# Patient Record
Sex: FEMALE | Race: WHITE | NOT HISPANIC OR LATINO | Employment: FULL TIME | URBAN - NONMETROPOLITAN AREA
[De-identification: names, ages, dates, MRNs, and addresses within clinical notes are randomized per-mention and may not be internally consistent; named-entity substitution may affect disease eponyms.]

---

## 2018-07-09 ENCOUNTER — TRANSCRIBE ORDERS (OUTPATIENT)
Dept: LAB | Facility: HOSPITAL | Age: 38
End: 2018-07-09

## 2018-07-09 ENCOUNTER — LAB (OUTPATIENT)
Dept: LAB | Facility: HOSPITAL | Age: 38
End: 2018-07-09

## 2018-07-09 DIAGNOSIS — E04.9 ENLARGEMENT OF THYROID: ICD-10-CM

## 2018-07-09 DIAGNOSIS — F11.20 OPIOID TYPE DEPENDENCE, CONTINUOUS (HCC): ICD-10-CM

## 2018-07-09 DIAGNOSIS — E65 LOCALIZED ADIPOSITY: ICD-10-CM

## 2018-07-09 DIAGNOSIS — D50.9 NORMOCYTIC HYPOCHROMIC ANEMIA: ICD-10-CM

## 2018-07-09 DIAGNOSIS — D50.0 BLOOD LOSS ANEMIA: ICD-10-CM

## 2018-07-09 DIAGNOSIS — D50.0 BLOOD LOSS ANEMIA: Primary | ICD-10-CM

## 2018-07-09 LAB
25(OH)D3 SERPL-MCNC: 78.3 NG/ML
ALBUMIN SERPL-MCNC: 4.3 G/DL (ref 3.5–5)
ALBUMIN/GLOB SERPL: 1.5 G/DL (ref 1–2)
ALP SERPL-CCNC: 65 U/L (ref 38–126)
ALT SERPL W P-5'-P-CCNC: 17 U/L (ref 13–69)
ANION GAP SERPL CALCULATED.3IONS-SCNC: 12.4 MMOL/L (ref 10–20)
AST SERPL-CCNC: 20 U/L (ref 15–46)
BASOPHILS # BLD AUTO: 0.1 10*3/MM3 (ref 0–0.2)
BASOPHILS NFR BLD AUTO: 1.1 % (ref 0–2.5)
BILIRUB SERPL-MCNC: 0.3 MG/DL (ref 0.2–1.3)
BUN BLD-MCNC: 9 MG/DL (ref 7–20)
BUN/CREAT SERPL: 7.5 (ref 7.1–23.5)
CALCIUM SPEC-SCNC: 9.9 MG/DL (ref 8.4–10.2)
CHLORIDE SERPL-SCNC: 102 MMOL/L (ref 98–107)
CHOLEST SERPL-MCNC: 204 MG/DL (ref 0–199)
CO2 SERPL-SCNC: 30 MMOL/L (ref 26–30)
CREAT BLD-MCNC: 1.2 MG/DL (ref 0.6–1.3)
DEPRECATED RDW RBC AUTO: 43.7 FL (ref 37–54)
EOSINOPHIL # BLD AUTO: 0.41 10*3/MM3 (ref 0–0.7)
EOSINOPHIL NFR BLD AUTO: 4.5 % (ref 0–7)
ERYTHROCYTE [DISTWIDTH] IN BLOOD BY AUTOMATED COUNT: 13.2 % (ref 11.5–14.5)
GFR SERPL CREATININE-BSD FRML MDRD: 50 ML/MIN/1.73
GLOBULIN UR ELPH-MCNC: 2.9 GM/DL
GLUCOSE BLD-MCNC: 91 MG/DL (ref 74–98)
HCT VFR BLD AUTO: 38.3 % (ref 37–47)
HDLC SERPL-MCNC: 49 MG/DL (ref 40–60)
HGB BLD-MCNC: 12.8 G/DL (ref 12–16)
IMM GRANULOCYTES # BLD: 0.01 10*3/MM3 (ref 0–0.06)
IMM GRANULOCYTES NFR BLD: 0.1 % (ref 0–0.6)
IRON 24H UR-MRATE: 79 MCG/DL (ref 37–181)
LDLC SERPL CALC-MCNC: 133 MG/DL (ref 0–99)
LDLC/HDLC SERPL: 2.71 {RATIO}
LYMPHOCYTES # BLD AUTO: 3.1 10*3/MM3 (ref 0.6–3.4)
LYMPHOCYTES NFR BLD AUTO: 34.3 % (ref 10–50)
MCH RBC QN AUTO: 30 PG (ref 27–31)
MCHC RBC AUTO-ENTMCNC: 33.4 G/DL (ref 30–37)
MCV RBC AUTO: 89.7 FL (ref 81–99)
MONOCYTES # BLD AUTO: 0.73 10*3/MM3 (ref 0–0.9)
MONOCYTES NFR BLD AUTO: 8.1 % (ref 0–12)
NEUTROPHILS # BLD AUTO: 4.68 10*3/MM3 (ref 2–6.9)
NEUTROPHILS NFR BLD AUTO: 51.9 % (ref 37–80)
NRBC BLD MANUAL-RTO: 0 /100 WBC (ref 0–0)
PLATELET # BLD AUTO: 314 10*3/MM3 (ref 130–400)
PMV BLD AUTO: 10.3 FL (ref 6–12)
POTASSIUM BLD-SCNC: 4.4 MMOL/L (ref 3.5–5.1)
PROT SERPL-MCNC: 7.2 G/DL (ref 6.3–8.2)
RBC # BLD AUTO: 4.27 10*6/MM3 (ref 4.2–5.4)
SODIUM BLD-SCNC: 140 MMOL/L (ref 137–145)
T4 FREE SERPL-MCNC: 1.03 NG/DL (ref 0.78–2.19)
TRIGL SERPL-MCNC: 110 MG/DL
TSH SERPL DL<=0.05 MIU/L-ACNC: 4.38 MIU/ML (ref 0.47–4.68)
VLDLC SERPL-MCNC: 22 MG/DL
WBC NRBC COR # BLD: 9.03 10*3/MM3 (ref 4.8–10.8)

## 2018-07-09 PROCEDURE — 84439 ASSAY OF FREE THYROXINE: CPT

## 2018-07-09 PROCEDURE — 80053 COMPREHEN METABOLIC PANEL: CPT

## 2018-07-09 PROCEDURE — 82306 VITAMIN D 25 HYDROXY: CPT

## 2018-07-09 PROCEDURE — 87389 HIV-1 AG W/HIV-1&-2 AB AG IA: CPT

## 2018-07-09 PROCEDURE — 85025 COMPLETE CBC W/AUTO DIFF WBC: CPT

## 2018-07-09 PROCEDURE — 80074 ACUTE HEPATITIS PANEL: CPT

## 2018-07-09 PROCEDURE — 83540 ASSAY OF IRON: CPT

## 2018-07-09 PROCEDURE — 36415 COLL VENOUS BLD VENIPUNCTURE: CPT

## 2018-07-09 PROCEDURE — 84481 FREE ASSAY (FT-3): CPT

## 2018-07-09 PROCEDURE — 80061 LIPID PANEL: CPT

## 2018-07-09 PROCEDURE — 84443 ASSAY THYROID STIM HORMONE: CPT

## 2018-07-10 LAB
HAV IGM SERPL QL IA: NEGATIVE
HBV CORE IGM SERPL QL IA: NEGATIVE
HBV SURFACE AG SERPL QL IA: NEGATIVE
HCV AB S/CO SERPL IA: <0.1 S/CO RATIO (ref 0–0.9)
HIV 1+2 AB+HIV1 P24 AG SERPL QL IA: NON REACTIVE
T3FREE SERPL-MCNC: 3.3 PG/ML (ref 2–4.4)

## 2019-07-16 ENCOUNTER — LAB (OUTPATIENT)
Dept: LAB | Facility: HOSPITAL | Age: 39
End: 2019-07-16

## 2019-07-16 ENCOUNTER — TRANSCRIBE ORDERS (OUTPATIENT)
Dept: LAB | Facility: HOSPITAL | Age: 39
End: 2019-07-16

## 2019-07-16 DIAGNOSIS — F11.20 OPIOID TYPE DEPENDENCE, CONTINUOUS (HCC): Primary | ICD-10-CM

## 2019-07-16 DIAGNOSIS — D50.0 IRON DEFICIENCY ANEMIA SECONDARY TO BLOOD LOSS (CHRONIC): ICD-10-CM

## 2019-07-16 DIAGNOSIS — F11.20 OPIOID TYPE DEPENDENCE, CONTINUOUS (HCC): ICD-10-CM

## 2019-07-16 LAB
25(OH)D3 SERPL-MCNC: 67.1 NG/ML
ALBUMIN SERPL-MCNC: 4.4 G/DL (ref 3.5–5)
ALBUMIN/GLOB SERPL: 1.3 G/DL (ref 1–2)
ALP SERPL-CCNC: 93 U/L (ref 38–126)
ALT SERPL W P-5'-P-CCNC: 27 U/L (ref 13–69)
ANION GAP SERPL CALCULATED.3IONS-SCNC: 12.1 MMOL/L (ref 10–20)
AST SERPL-CCNC: 29 U/L (ref 15–46)
BILIRUB SERPL-MCNC: 0.2 MG/DL (ref 0.2–1.3)
BUN BLD-MCNC: 8 MG/DL (ref 7–20)
BUN/CREAT SERPL: 8 (ref 7.1–23.5)
CALCIUM SPEC-SCNC: 9.2 MG/DL (ref 8.4–10.2)
CHLORIDE SERPL-SCNC: 100 MMOL/L (ref 98–107)
CHOLEST SERPL-MCNC: 251 MG/DL (ref 0–199)
CO2 SERPL-SCNC: 32 MMOL/L (ref 26–30)
CREAT BLD-MCNC: 1 MG/DL (ref 0.6–1.3)
DEPRECATED RDW RBC AUTO: 46.5 FL (ref 37–54)
ERYTHROCYTE [DISTWIDTH] IN BLOOD BY AUTOMATED COUNT: 14 % (ref 12.3–15.4)
GFR SERPL CREATININE-BSD FRML MDRD: 62 ML/MIN/1.73
GLOBULIN UR ELPH-MCNC: 3.3 GM/DL
GLUCOSE BLD-MCNC: 75 MG/DL (ref 74–98)
HCT VFR BLD AUTO: 41.1 % (ref 34–46.6)
HDLC SERPL-MCNC: 47 MG/DL (ref 40–60)
HGB BLD-MCNC: 13.4 G/DL (ref 12–15.9)
LDLC SERPL CALC-MCNC: 161 MG/DL (ref 0–99)
LDLC/HDLC SERPL: 3.43 {RATIO}
MCH RBC QN AUTO: 29.5 PG (ref 26.6–33)
MCHC RBC AUTO-ENTMCNC: 32.6 G/DL (ref 31.5–35.7)
MCV RBC AUTO: 90.5 FL (ref 79–97)
PLATELET # BLD AUTO: 355 10*3/MM3 (ref 140–450)
PMV BLD AUTO: 9.4 FL (ref 6–12)
POTASSIUM BLD-SCNC: 4.1 MMOL/L (ref 3.5–5.1)
PROT SERPL-MCNC: 7.7 G/DL (ref 6.3–8.2)
RBC # BLD AUTO: 4.54 10*6/MM3 (ref 3.77–5.28)
SODIUM BLD-SCNC: 140 MMOL/L (ref 137–145)
TRIGL SERPL-MCNC: 215 MG/DL
TSH SERPL DL<=0.05 MIU/L-ACNC: 4.99 MIU/ML (ref 0.47–4.68)
VLDLC SERPL-MCNC: 43 MG/DL
WBC NRBC COR # BLD: 8.14 10*3/MM3 (ref 3.4–10.8)

## 2019-07-16 PROCEDURE — 80074 ACUTE HEPATITIS PANEL: CPT

## 2019-07-16 PROCEDURE — 86701 HIV-1ANTIBODY: CPT

## 2019-07-16 PROCEDURE — 85027 COMPLETE CBC AUTOMATED: CPT

## 2019-07-16 PROCEDURE — 84443 ASSAY THYROID STIM HORMONE: CPT

## 2019-07-16 PROCEDURE — 80061 LIPID PANEL: CPT

## 2019-07-16 PROCEDURE — 82306 VITAMIN D 25 HYDROXY: CPT

## 2019-07-16 PROCEDURE — 86702 HIV-2 ANTIBODY: CPT

## 2019-07-16 PROCEDURE — 80053 COMPREHEN METABOLIC PANEL: CPT

## 2019-07-16 PROCEDURE — 36415 COLL VENOUS BLD VENIPUNCTURE: CPT

## 2019-07-17 LAB
HAV IGM SERPL QL IA: NEGATIVE
HBV CORE IGM SERPL QL IA: NEGATIVE
HBV SURFACE AG SERPL QL IA: NEGATIVE
HCV AB S/CO SERPL IA: <0.1 S/CO RATIO (ref 0–0.9)
HIV 1 & 2 AB SERPLBLD IA.RAPID: NEGATIVE
HIV 2 AB SERPLBLD QL IA.RAPID: NEGATIVE
HIV1 AB SERPLBLD QL IA.RAPID: NEGATIVE

## 2019-08-21 ENCOUNTER — LAB (OUTPATIENT)
Dept: LAB | Facility: HOSPITAL | Age: 39
End: 2019-08-21

## 2019-08-21 ENCOUNTER — TRANSCRIBE ORDERS (OUTPATIENT)
Dept: LAB | Facility: HOSPITAL | Age: 39
End: 2019-08-21

## 2019-08-21 DIAGNOSIS — E78.5 HYPERLIPIDEMIA, UNSPECIFIED HYPERLIPIDEMIA TYPE: ICD-10-CM

## 2019-08-21 DIAGNOSIS — E03.9 PRIMARY HYPOTHYROIDISM: ICD-10-CM

## 2019-08-21 DIAGNOSIS — E03.9 PRIMARY HYPOTHYROIDISM: Primary | ICD-10-CM

## 2019-08-21 LAB
T3 SERPL-MCNC: 167 NG/DL (ref 80–200)
T4 FREE SERPL-MCNC: 1 NG/DL (ref 0.93–1.7)
T4 SERPL-MCNC: 7.18 MCG/DL (ref 4.5–11.7)
TSH SERPL DL<=0.05 MIU/L-ACNC: 5.81 MIU/ML (ref 0.27–4.2)

## 2019-08-21 PROCEDURE — 84480 ASSAY TRIIODOTHYRONINE (T3): CPT

## 2019-08-21 PROCEDURE — 84436 ASSAY OF TOTAL THYROXINE: CPT

## 2019-08-21 PROCEDURE — 84439 ASSAY OF FREE THYROXINE: CPT

## 2019-08-21 PROCEDURE — 84443 ASSAY THYROID STIM HORMONE: CPT

## 2019-08-21 PROCEDURE — 36415 COLL VENOUS BLD VENIPUNCTURE: CPT

## 2019-11-13 ENCOUNTER — OFFICE VISIT (OUTPATIENT)
Dept: FAMILY MEDICINE CLINIC | Facility: CLINIC | Age: 39
End: 2019-11-13

## 2019-11-13 ENCOUNTER — APPOINTMENT (OUTPATIENT)
Dept: LAB | Facility: HOSPITAL | Age: 39
End: 2019-11-13

## 2019-11-13 VITALS
HEART RATE: 92 BPM | HEIGHT: 68 IN | WEIGHT: 267 LBS | SYSTOLIC BLOOD PRESSURE: 120 MMHG | DIASTOLIC BLOOD PRESSURE: 60 MMHG | OXYGEN SATURATION: 97 % | BODY MASS INDEX: 40.47 KG/M2

## 2019-11-13 DIAGNOSIS — Z87.891 PERSONAL HISTORY OF TOBACCO USE, PRESENTING HAZARDS TO HEALTH: ICD-10-CM

## 2019-11-13 DIAGNOSIS — Z83.49 FAMILY HISTORY OF HYPOTHYROIDISM: ICD-10-CM

## 2019-11-13 DIAGNOSIS — R60.0 PEDAL EDEMA: Primary | ICD-10-CM

## 2019-11-13 DIAGNOSIS — R63.5 WEIGHT GAIN: ICD-10-CM

## 2019-11-13 PROBLEM — E03.9 HYPOTHYROIDISM: Status: ACTIVE | Noted: 2019-11-13

## 2019-11-13 LAB
ALBUMIN SERPL-MCNC: 4.1 G/DL (ref 3.5–5.2)
ALBUMIN UR-MCNC: <1.2 MG/DL
ALBUMIN/GLOB SERPL: 1.1 G/DL
ALP SERPL-CCNC: 91 U/L (ref 39–117)
ALT SERPL W P-5'-P-CCNC: 15 U/L (ref 1–33)
ANION GAP SERPL CALCULATED.3IONS-SCNC: 12.5 MMOL/L (ref 5–15)
AST SERPL-CCNC: 15 U/L (ref 1–32)
BASOPHILS # BLD AUTO: 0.1 10*3/MM3 (ref 0–0.2)
BASOPHILS NFR BLD AUTO: 0.9 % (ref 0–1.5)
BILIRUB SERPL-MCNC: 0.2 MG/DL (ref 0.2–1.2)
BILIRUB UR QL STRIP: NEGATIVE
BUN BLD-MCNC: 4 MG/DL (ref 6–20)
BUN/CREAT SERPL: 3.8 (ref 7–25)
CALCIUM SPEC-SCNC: 9.5 MG/DL (ref 8.6–10.5)
CHLORIDE SERPL-SCNC: 101 MMOL/L (ref 98–107)
CHOLEST SERPL-MCNC: 189 MG/DL (ref 0–200)
CLARITY UR: CLEAR
CO2 SERPL-SCNC: 28.5 MMOL/L (ref 22–29)
COLOR UR: YELLOW
CREAT BLD-MCNC: 1.05 MG/DL (ref 0.57–1)
CREAT UR-MCNC: 57.6 MG/DL
DEPRECATED RDW RBC AUTO: 45 FL (ref 37–54)
EOSINOPHIL # BLD AUTO: 0.39 10*3/MM3 (ref 0–0.4)
EOSINOPHIL NFR BLD AUTO: 3.6 % (ref 0.3–6.2)
ERYTHROCYTE [DISTWIDTH] IN BLOOD BY AUTOMATED COUNT: 13.3 % (ref 12.3–15.4)
FERRITIN SERPL-MCNC: 53.6 NG/ML (ref 13–150)
FSH SERPL-ACNC: 13.9 MIU/ML
GFR SERPL CREATININE-BSD FRML MDRD: 58 ML/MIN/1.73
GLOBULIN UR ELPH-MCNC: 3.6 GM/DL
GLUCOSE BLD-MCNC: 78 MG/DL (ref 65–99)
GLUCOSE UR STRIP-MCNC: NEGATIVE MG/DL
HBA1C MFR BLD: 6.11 % (ref 4.8–5.6)
HCT VFR BLD AUTO: 40 % (ref 34–46.6)
HDLC SERPL-MCNC: 49 MG/DL (ref 40–60)
HGB BLD-MCNC: 13 G/DL (ref 12–15.9)
HGB UR QL STRIP.AUTO: NEGATIVE
IMM GRANULOCYTES # BLD AUTO: 0.04 10*3/MM3 (ref 0–0.05)
IMM GRANULOCYTES NFR BLD AUTO: 0.4 % (ref 0–0.5)
KETONES UR QL STRIP: NEGATIVE
LDLC SERPL CALC-MCNC: 99 MG/DL (ref 0–100)
LDLC/HDLC SERPL: 2.02 {RATIO}
LEUKOCYTE ESTERASE UR QL STRIP.AUTO: NEGATIVE
LH SERPL-ACNC: 11.9 MIU/ML
LYMPHOCYTES # BLD AUTO: 2.01 10*3/MM3 (ref 0.7–3.1)
LYMPHOCYTES NFR BLD AUTO: 18.5 % (ref 19.6–45.3)
MCH RBC QN AUTO: 29.6 PG (ref 26.6–33)
MCHC RBC AUTO-ENTMCNC: 32.5 G/DL (ref 31.5–35.7)
MCV RBC AUTO: 91.1 FL (ref 79–97)
MICROALBUMIN/CREAT UR: NORMAL MG/G{CREAT}
MONOCYTES # BLD AUTO: 0.93 10*3/MM3 (ref 0.1–0.9)
MONOCYTES NFR BLD AUTO: 8.6 % (ref 5–12)
NEUTROPHILS # BLD AUTO: 7.37 10*3/MM3 (ref 1.7–7)
NEUTROPHILS NFR BLD AUTO: 68 % (ref 42.7–76)
NITRITE UR QL STRIP: NEGATIVE
NRBC BLD AUTO-RTO: 0.1 /100 WBC (ref 0–0.2)
PH UR STRIP.AUTO: 7 [PH] (ref 5–8)
PLATELET # BLD AUTO: 372 10*3/MM3 (ref 140–450)
PMV BLD AUTO: 11 FL (ref 6–12)
POTASSIUM BLD-SCNC: 4.6 MMOL/L (ref 3.5–5.2)
PROLACTIN SERPL-MCNC: 16.9 NG/ML (ref 4.79–23.3)
PROT SERPL-MCNC: 7.7 G/DL (ref 6–8.5)
PROT UR QL STRIP: NEGATIVE
RBC # BLD AUTO: 4.39 10*6/MM3 (ref 3.77–5.28)
SODIUM BLD-SCNC: 142 MMOL/L (ref 136–145)
SP GR UR STRIP: 1.01 (ref 1–1.03)
T3FREE SERPL-MCNC: 3.71 PG/ML (ref 2–4.4)
T4 FREE SERPL-MCNC: 0.98 NG/DL (ref 0.93–1.7)
TESTOST SERPL-MCNC: 16.1 NG/DL (ref 8.4–48.1)
TRIGL SERPL-MCNC: 204 MG/DL (ref 0–150)
TSH SERPL DL<=0.05 MIU/L-ACNC: 5.32 UIU/ML (ref 0.27–4.2)
UROBILINOGEN UR QL STRIP: NORMAL
VLDLC SERPL-MCNC: 40.8 MG/DL (ref 5–40)
WBC NRBC COR # BLD: 10.84 10*3/MM3 (ref 3.4–10.8)

## 2019-11-13 PROCEDURE — 82728 ASSAY OF FERRITIN: CPT | Performed by: FAMILY MEDICINE

## 2019-11-13 PROCEDURE — 84481 FREE ASSAY (FT-3): CPT | Performed by: FAMILY MEDICINE

## 2019-11-13 PROCEDURE — 84146 ASSAY OF PROLACTIN: CPT | Performed by: FAMILY MEDICINE

## 2019-11-13 PROCEDURE — 85025 COMPLETE CBC W/AUTO DIFF WBC: CPT | Performed by: FAMILY MEDICINE

## 2019-11-13 PROCEDURE — 84403 ASSAY OF TOTAL TESTOSTERONE: CPT | Performed by: FAMILY MEDICINE

## 2019-11-13 PROCEDURE — 83001 ASSAY OF GONADOTROPIN (FSH): CPT | Performed by: FAMILY MEDICINE

## 2019-11-13 PROCEDURE — 82043 UR ALBUMIN QUANTITATIVE: CPT | Performed by: FAMILY MEDICINE

## 2019-11-13 PROCEDURE — 80061 LIPID PANEL: CPT | Performed by: FAMILY MEDICINE

## 2019-11-13 PROCEDURE — 82570 ASSAY OF URINE CREATININE: CPT | Performed by: FAMILY MEDICINE

## 2019-11-13 PROCEDURE — 84402 ASSAY OF FREE TESTOSTERONE: CPT | Performed by: FAMILY MEDICINE

## 2019-11-13 PROCEDURE — 80053 COMPREHEN METABOLIC PANEL: CPT | Performed by: FAMILY MEDICINE

## 2019-11-13 PROCEDURE — 83002 ASSAY OF GONADOTROPIN (LH): CPT | Performed by: FAMILY MEDICINE

## 2019-11-13 PROCEDURE — 83036 HEMOGLOBIN GLYCOSYLATED A1C: CPT | Performed by: FAMILY MEDICINE

## 2019-11-13 PROCEDURE — 84443 ASSAY THYROID STIM HORMONE: CPT | Performed by: FAMILY MEDICINE

## 2019-11-13 PROCEDURE — 82627 DEHYDROEPIANDROSTERONE: CPT | Performed by: FAMILY MEDICINE

## 2019-11-13 PROCEDURE — 81003 URINALYSIS AUTO W/O SCOPE: CPT | Performed by: FAMILY MEDICINE

## 2019-11-13 PROCEDURE — 84439 ASSAY OF FREE THYROXINE: CPT | Performed by: FAMILY MEDICINE

## 2019-11-13 PROCEDURE — 99204 OFFICE O/P NEW MOD 45 MIN: CPT | Performed by: FAMILY MEDICINE

## 2019-11-13 RX ORDER — ATORVASTATIN CALCIUM 20 MG/1
1 TABLET, FILM COATED ORAL DAILY
Refills: 0 | COMMUNITY
Start: 2019-11-06 | End: 2020-03-19 | Stop reason: SDUPTHER

## 2019-11-13 RX ORDER — IBUPROFEN 800 MG/1
TABLET ORAL
Refills: 0 | COMMUNITY
Start: 2019-11-06 | End: 2020-01-29 | Stop reason: ALTCHOICE

## 2019-11-13 RX ORDER — GABAPENTIN 300 MG/1
1 CAPSULE ORAL 2 TIMES DAILY
Refills: 0 | COMMUNITY
Start: 2019-10-22 | End: 2020-01-29 | Stop reason: SDUPTHER

## 2019-11-13 RX ORDER — BUPROPION HYDROCHLORIDE 150 MG/1
TABLET, EXTENDED RELEASE ORAL
Refills: 0 | COMMUNITY
Start: 2019-10-27 | End: 2020-03-19 | Stop reason: SDUPTHER

## 2019-11-13 RX ORDER — BUPRENORPHINE HYDROCHLORIDE, NALOXONE HYDROCHLORIDE 8; 2 MG/1; MG/1
FILM, SOLUBLE BUCCAL; SUBLINGUAL
Refills: 0 | COMMUNITY
Start: 2019-10-22

## 2019-11-13 RX ORDER — DULOXETIN HYDROCHLORIDE 20 MG/1
3 CAPSULE, DELAYED RELEASE ORAL DAILY
Refills: 0 | COMMUNITY
Start: 2019-10-22 | End: 2020-03-19 | Stop reason: SDUPTHER

## 2019-11-13 RX ORDER — ACYCLOVIR 800 MG/1
TABLET ORAL
Refills: 0 | COMMUNITY
Start: 2019-08-27

## 2019-11-13 NOTE — PROGRESS NOTES
Subjective   Shanae Alves is a 39 y.o. female.     Chief Complaint   Patient presents with   • Establish Care     edema, TSH       History of Present Illness     Previous primary care: Dr. Valencia    Chronic health conditions:  Pedal edema: x last 6 months, has stayed about the same. Left foot ankle pain when it starts to swell.  HLD: On atorvastatin 20mg daily  Depression: Stable on duloxetine 40mg (2 capsules of 20mg) daily and Wellbutrin SR 150mg BID  Weight gain over past 1 year: Went through a divorce, gained 60 pounds over the last 1 year. 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 Stopped smoking 1 year ago. Previously 2ppd. Back up to 1ppd.  Suboxone therapy: Will continue with another MD in Dr. Valencia's office    Other physicians currently involved in patient's care:  Dr. Mancilla, GYN    Acute complaints:  Shanae Alves is a 39 y.o. female who presents today to establish care.     This patient is accompanied by their self who contributes to the history of their care.    The following portions of the patient's history were reviewed and updated as appropriate: allergies, current medications, past family history, past medical history, past social history, past surgical history and problem list.    Active Ambulatory Problems     Diagnosis Date Noted   • Family history of hypothyroidism 11/13/2019   • Personal history of tobacco use, presenting hazards to health 11/13/2019     Resolved Ambulatory Problems     Diagnosis Date Noted   • No Resolved Ambulatory Problems     Past Medical History:   Diagnosis Date   • Arthritis    • Depression    • Headache    • Hyperlipidemia    • Hypothyroidism      Past Surgical History:   Procedure Laterality Date   • CERVICAL CERCLAGE  2012     Family History   Problem Relation Age of Onset   • Arthritis Mother    • Hyperlipidemia Mother    • Thyroid disease Mother    • Arthritis Father      Social History     Socioeconomic History   • Marital status: Single      "Spouse name: Not on file   • Number of children: Not on file   • Years of education: Not on file   • Highest education level: Not on file   Tobacco Use   • Smoking status: Current Every Day Smoker     Packs/day: 1.00     Types: Cigarettes   • Smokeless tobacco: Never Used   Substance and Sexual Activity   • Alcohol use: No     Frequency: Never   • Drug use: No         Review of Systems   Constitutional: Positive for fatigue and unexpected weight change.   Cardiovascular: Positive for leg swelling.       Objective   Blood pressure 120/60, pulse 92, height 172.7 cm (68\"), weight 121 kg (267 lb), SpO2 97 %.  Nursing note reviewed  Physical Exam  Const: NAD, A&Ox4, Pleasant, Cooperative  Eyes: EOMI, no conjunctivitis  ENT: No nasal discharge present, neck supple  Cardiac: Regular rate and rhythm, no cyanosis  Resp: Respiratory rate within normal limits, no increased work of breathing, no audible wheezing or retractions noted  GI: No distention or ascites  MSK: Very trace pedal edema  Procedures  Assessment/Plan   Problem List Items Addressed This Visit        Other    Family history of hypothyroidism    Relevant Orders    Testosterone    DHEA-sulfate    Follicle stimulating hormone    Luteinizing hormone    Prolactin    TSH    Hemoglobin A1c    Lipid Panel    T4, Free    T3, Free    Ferritin    Comprehensive Metabolic Panel    CBC & Differential    Urinalysis With Microscopic If Indicated (No Culture) - Urine, Clean Catch    Microalbumin / Creatinine Urine Ratio - Urine, Clean Catch    Testosterone Free Direct    Personal history of tobacco use, presenting hazards to health    Relevant Orders    Testosterone    DHEA-sulfate    Follicle stimulating hormone    Luteinizing hormone    Prolactin    TSH    Hemoglobin A1c    Lipid Panel    T4, Free    T3, Free    Ferritin    Comprehensive Metabolic Panel    CBC & Differential    Urinalysis With Microscopic If Indicated (No Culture) - Urine, Clean Catch    Microalbumin / " Creatinine Urine Ratio - Urine, Clean Catch    Testosterone Free Direct      Other Visit Diagnoses     Pedal edema    -  Primary    Relevant Orders    Testosterone    DHEA-sulfate    Follicle stimulating hormone    Luteinizing hormone    Prolactin    TSH    Hemoglobin A1c    Lipid Panel    T4, Free    T3, Free    Ferritin    Comprehensive Metabolic Panel    CBC & Differential    Urinalysis With Microscopic If Indicated (No Culture) - Urine, Clean Catch    Microalbumin / Creatinine Urine Ratio - Urine, Clean Catch    Testosterone Free Direct    Weight gain        Relevant Orders    Testosterone    DHEA-sulfate    Follicle stimulating hormone    Luteinizing hormone    Prolactin    TSH    Hemoglobin A1c    Lipid Panel    T4, Free    T3, Free    Ferritin    Comprehensive Metabolic Panel    CBC & Differential    Urinalysis With Microscopic If Indicated (No Culture) - Urine, Clean Catch    Microalbumin / Creatinine Urine Ratio - Urine, Clean Catch    Testosterone Free Direct        #1 pedal edema  No significant edema on exam today.  With her weight gain and family history of hypothyroidism, thyroid dysfunction is certainly a possibility.  We also discussed possible contributions from low activity, chronic opioid therapy, and hormone dysfunction.  Will check a panel of labs as indicated above, I will see her back in 1 week for review.    I have spent 45 minutes face-to-face with 25 minutes spent counseling the patient on the diagnoses, possible treatment options and outcomes, ajunctive and alternative treatments, and developing a care plan.    We will plan to obtain previous records both for chronic preventative care as well as those related to the current episode of care.  Any records that the patient brought with her today were reviewed personally by me during the visit today and will be scanned into the chart for posterity.    There are no Patient Instructions on file for this visit.    Return in about 1 week (around  11/20/2019) for follow up to discuss lab results.    Ambulatory progress note signed and attested to by Junior Lujan D.O.

## 2019-11-20 ENCOUNTER — OFFICE VISIT (OUTPATIENT)
Dept: FAMILY MEDICINE CLINIC | Facility: CLINIC | Age: 39
End: 2019-11-20

## 2019-11-20 VITALS
OXYGEN SATURATION: 98 % | HEIGHT: 68 IN | SYSTOLIC BLOOD PRESSURE: 140 MMHG | WEIGHT: 263 LBS | HEART RATE: 103 BPM | DIASTOLIC BLOOD PRESSURE: 80 MMHG | BODY MASS INDEX: 39.86 KG/M2

## 2019-11-20 DIAGNOSIS — R63.5 WEIGHT GAIN: ICD-10-CM

## 2019-11-20 DIAGNOSIS — Z83.49 FAMILY HISTORY OF HYPOTHYROIDISM: ICD-10-CM

## 2019-11-20 DIAGNOSIS — R60.0 PEDAL EDEMA: Primary | ICD-10-CM

## 2019-11-20 DIAGNOSIS — G47.9 DYSSOMNIA: ICD-10-CM

## 2019-11-20 DIAGNOSIS — R73.03 PREDIABETES: ICD-10-CM

## 2019-11-20 DIAGNOSIS — E03.8 SUBCLINICAL HYPOTHYROIDISM: ICD-10-CM

## 2019-11-20 DIAGNOSIS — Z79.899 CONTROLLED SUBSTANCE AGREEMENT SIGNED: ICD-10-CM

## 2019-11-20 DIAGNOSIS — Z87.891 PERSONAL HISTORY OF TOBACCO USE, PRESENTING HAZARDS TO HEALTH: ICD-10-CM

## 2019-11-20 PROCEDURE — 99214 OFFICE O/P EST MOD 30 MIN: CPT | Performed by: FAMILY MEDICINE

## 2019-11-20 RX ORDER — PRASTERONE (DHEA) 50 MG
50 CAPSULE ORAL DAILY
Qty: 90 EACH | Refills: 3 | Status: SHIPPED | OUTPATIENT
Start: 2019-11-20 | End: 2020-01-29 | Stop reason: SINTOL

## 2019-11-20 RX ORDER — CHOLECALCIFEROL (VITAMIN D3) 125 MCG
5 CAPSULE ORAL NIGHTLY
Qty: 90 TABLET | Refills: 3 | Status: SHIPPED | OUTPATIENT
Start: 2019-11-20 | End: 2020-09-22 | Stop reason: SDUPTHER

## 2019-11-20 RX ORDER — LEVOTHYROXINE SODIUM 0.03 MG/1
25 TABLET ORAL DAILY
Qty: 30 TABLET | Refills: 2 | Status: SHIPPED | OUTPATIENT
Start: 2019-11-20 | End: 2020-01-29 | Stop reason: SDUPTHER

## 2019-11-20 NOTE — PATIENT INSTRUCTIONS
1.  New medications and supplements - as listed.    2.  Continue well-balanced diet - low in calories and low in added sugar.  -Plant-based diets have the best evidence for decreasing inflammation and chronic pain, as well as reducing the risk of heart disease and dementia.    3.  Maintain a routine exercise program - 30 minutes at least 3 days every week.  This is important even if you are active at your job. Try to walk at least 5000 steps per day.    4.  Completely stop all regular soda. Water if possible.    5.  If you would like to have your labs completed prior to your next visit to be discussed at your appointment, please call the office 1 to 2 weeks before your scheduled visit to request that lab orders be placed.

## 2019-11-20 NOTE — PROGRESS NOTES
Subjective   Shanae Alves is a 39 y.o. female.     Chief Complaint   Patient presents with   • Follow-up       History of Present Illness     Chronic health conditions:  Pedal edema: x last 6 months, has stayed about the same. Left foot ankle pain when it starts to swell.  HLD: On atorvastatin 20mg daily  Depression: Stable on duloxetine 40mg (2 capsules of 20mg) daily and Wellbutrin SR 150mg BID  Weight gain over past 1 year: Went through a divorce, gained 60 pounds over the last 1 year. 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 Stopped smoking 1 year ago. Previously 2ppd. Back up to 1ppd.  Suboxone therapy: Will continue with another MD in Dr. Valencia's office.  She will need the gabapentin prescribed by our office and will fill out a controlled substance agreement today.    Other physicians currently involved in patient's care:  Dr. Mancilla, GYN    Acute complaints:  Shanae Alves is a 39 y.o. female who presents today to follow-up on her labs.  She had them drawn last week.  Her symptoms have not changed, she still has significant fatigue, trouble sleeping, typically getting about 5 hours per night.    This patient is accompanied by their self who contributes to the history of their care.    The following portions of the patient's history were reviewed and updated as appropriate: allergies, current medications, past family history, past medical history, past social history, past surgical history and problem list.    Active Ambulatory Problems     Diagnosis Date Noted   • Family history of hypothyroidism 11/13/2019   • Personal history of tobacco use, presenting hazards to health 11/13/2019   • Subclinical hypothyroidism 11/20/2019   • Prediabetes 11/20/2019     Resolved Ambulatory Problems     Diagnosis Date Noted   • No Resolved Ambulatory Problems     Past Medical History:   Diagnosis Date   • Arthritis    • Depression    • Headache    • Hyperlipidemia    • Hypothyroidism      Past Surgical  "History:   Procedure Laterality Date   • CERVICAL CERCLAGE  2012     Family History   Problem Relation Age of Onset   • Arthritis Mother    • Hyperlipidemia Mother    • Thyroid disease Mother    • Arthritis Father      Social History     Socioeconomic History   • Marital status: Single     Spouse name: Not on file   • Number of children: Not on file   • Years of education: Not on file   • Highest education level: Not on file   Tobacco Use   • Smoking status: Current Every Day Smoker     Packs/day: 1.00     Types: Cigarettes   • Smokeless tobacco: Never Used   Substance and Sexual Activity   • Alcohol use: No     Frequency: Never   • Drug use: No         Review of Systems   Constitutional: Positive for fatigue.   Respiratory: Negative.    Cardiovascular: Positive for leg swelling.   Musculoskeletal: Positive for joint swelling.       Objective   Blood pressure 140/80, pulse 103, height 172.7 cm (67.99\"), weight 119 kg (263 lb), SpO2 98 %.  Nursing note reviewed  Physical Exam  Const: NAD, A&Ox4, Pleasant, Cooperative  Eyes: EOMI, no conjunctivitis  ENT: No nasal discharge present, neck supple  Cardiac: Regular rate and rhythm, no cyanosis  Resp: Respiratory rate within normal limits, no increased work of breathing, no audible wheezing or retractions noted  GI: No distention or ascites  MSK: No significant pedal edema noted today  Procedures  Assessment/Plan   Problem List Items Addressed This Visit        Endocrine    Subclinical hypothyroidism    Relevant Medications    levothyroxine (SYNTHROID, LEVOTHROID) 25 MCG tablet    Other Relevant Orders    TSH       Other    Family history of hypothyroidism    Personal history of tobacco use, presenting hazards to health    Prediabetes    Overview     A1c 6.11% November 2019         Relevant Medications    metFORMIN (GLUCOPHAGE) 500 MG tablet    DHEA 50 MG capsule    Other Relevant Orders    Basic Metabolic Panel      Other Visit Diagnoses     Pedal edema    -  Primary    " Relevant Medications    levothyroxine (SYNTHROID, LEVOTHROID) 25 MCG tablet    Other Relevant Orders    Basic Metabolic Panel    Weight gain        Relevant Medications    DHEA 50 MG capsule    Dyssomnia        Relevant Medications    melatonin 5 MG tablet tablet        #1 subclinical hypothyroidism  We discussed at length the physiology of the thyroid and thyroid regulation.  She has a very slightly elevated TSH with a low normal free T4 level.  We discussed that the TSH may be transiently elevated.  However given her significant family history of hypothyroidism, as well as her symptoms correlated with same, it would be a reasonable option to start her on a low-dose of levothyroxine and assess her response over the next 6 weeks.    #2 prediabetes  This is a new diagnosis.  We discussed the diagnosis and recommendations.  She may benefit from diabetic education and nutrition consultation.    #3 dyssomnia  Discussed sleep hygiene, recommended melatonin 5 mg at night    4.  Weight gain/obesity  Obviously diet and calorie intake will be important.  She is typically drinking 2-3 regular sodas per day.  We discussed the caloric impact of these, and I recommended that she avoid all calorie-containing beverages going forward.    I have spent 25 minutes face-to-face with 15 minutes spent counseling the patient on the diagnoses, possible treatment options and outcomes, adjunctive and alternative treatments, and developing a care plan.    Patient Instructions   1.  New medications and supplements - as listed.    2.  Continue well-balanced diet - low in calories and low in added sugar.  -Plant-based diets have the best evidence for decreasing inflammation and chronic pain, as well as reducing the risk of heart disease and dementia.    3.  Maintain a routine exercise program - 30 minutes at least 3 days every week.  This is important even if you are active at your job. Try to walk at least 5000 steps per day.    4.  Completely  stop all regular soda. Water if possible.    5.  If you would like to have your labs completed prior to your next visit to be discussed at your appointment, please call the office 1 to 2 weeks before your scheduled visit to request that lab orders be placed.      Return in about 7 weeks (around 1/8/2020) for (non-fasting blood work 1 week prior to appt).    Ambulatory progress note signed and attested to by Junior Lujan D.O.

## 2019-11-21 LAB
DHEA-S SERPL-MCNC: 114.8 UG/DL (ref 57.3–279.2)
TESTOST FREE SERPL-MCNC: 0.5 PG/ML (ref 0–4.2)

## 2019-11-27 DIAGNOSIS — Z79.899 CONTROLLED SUBSTANCE AGREEMENT SIGNED: ICD-10-CM

## 2020-01-20 ENCOUNTER — LAB (OUTPATIENT)
Dept: LAB | Facility: HOSPITAL | Age: 40
End: 2020-01-20

## 2020-01-20 ENCOUNTER — APPOINTMENT (OUTPATIENT)
Dept: LAB | Facility: HOSPITAL | Age: 40
End: 2020-01-20

## 2020-01-20 DIAGNOSIS — R60.0 PEDAL EDEMA: ICD-10-CM

## 2020-01-20 DIAGNOSIS — R73.03 PREDIABETES: ICD-10-CM

## 2020-01-20 DIAGNOSIS — E03.8 SUBCLINICAL HYPOTHYROIDISM: ICD-10-CM

## 2020-01-20 LAB
ANION GAP SERPL CALCULATED.3IONS-SCNC: 11.1 MMOL/L (ref 5–15)
BUN BLD-MCNC: 8 MG/DL (ref 6–20)
BUN/CREAT SERPL: 7.9 (ref 7–25)
CALCIUM SPEC-SCNC: 9.7 MG/DL (ref 8.6–10.5)
CHLORIDE SERPL-SCNC: 98 MMOL/L (ref 98–107)
CO2 SERPL-SCNC: 29.9 MMOL/L (ref 22–29)
CREAT BLD-MCNC: 1.01 MG/DL (ref 0.57–1)
GFR SERPL CREATININE-BSD FRML MDRD: 61 ML/MIN/1.73
GLUCOSE BLD-MCNC: 94 MG/DL (ref 65–99)
POTASSIUM BLD-SCNC: 5.2 MMOL/L (ref 3.5–5.2)
SODIUM BLD-SCNC: 139 MMOL/L (ref 136–145)
TSH SERPL DL<=0.05 MIU/L-ACNC: 2.56 UIU/ML (ref 0.27–4.2)

## 2020-01-20 PROCEDURE — 80048 BASIC METABOLIC PNL TOTAL CA: CPT | Performed by: FAMILY MEDICINE

## 2020-01-20 PROCEDURE — 84443 ASSAY THYROID STIM HORMONE: CPT | Performed by: FAMILY MEDICINE

## 2020-01-29 ENCOUNTER — OFFICE VISIT (OUTPATIENT)
Dept: FAMILY MEDICINE CLINIC | Facility: CLINIC | Age: 40
End: 2020-01-29

## 2020-01-29 VITALS
WEIGHT: 254 LBS | HEIGHT: 68 IN | OXYGEN SATURATION: 98 % | HEART RATE: 88 BPM | DIASTOLIC BLOOD PRESSURE: 70 MMHG | BODY MASS INDEX: 38.49 KG/M2 | SYSTOLIC BLOOD PRESSURE: 124 MMHG

## 2020-01-29 DIAGNOSIS — R73.03 PREDIABETES: Primary | ICD-10-CM

## 2020-01-29 DIAGNOSIS — R60.0 PEDAL EDEMA: ICD-10-CM

## 2020-01-29 DIAGNOSIS — M25.562 ACUTE PAIN OF LEFT KNEE: ICD-10-CM

## 2020-01-29 DIAGNOSIS — M25.50 POLYARTHRALGIA: ICD-10-CM

## 2020-01-29 DIAGNOSIS — E03.8 SUBCLINICAL HYPOTHYROIDISM: ICD-10-CM

## 2020-01-29 PROCEDURE — 99214 OFFICE O/P EST MOD 30 MIN: CPT | Performed by: FAMILY MEDICINE

## 2020-01-29 RX ORDER — MELOXICAM 15 MG/1
15 TABLET ORAL DAILY
Qty: 30 TABLET | Refills: 1 | Status: SHIPPED | OUTPATIENT
Start: 2020-01-29 | End: 2020-03-19 | Stop reason: SDUPTHER

## 2020-01-29 RX ORDER — LEVOTHYROXINE SODIUM 0.03 MG/1
25 TABLET ORAL DAILY
Qty: 90 TABLET | Refills: 3 | Status: SHIPPED | OUTPATIENT
Start: 2020-01-29 | End: 2020-03-19 | Stop reason: SDUPTHER

## 2020-01-29 RX ORDER — GABAPENTIN 300 MG/1
300 CAPSULE ORAL 2 TIMES DAILY
Qty: 60 CAPSULE | Refills: 2 | Status: SHIPPED | OUTPATIENT
Start: 2020-01-29 | End: 2020-03-19 | Stop reason: SDUPTHER

## 2020-01-29 NOTE — PATIENT INSTRUCTIONS
1.  Stop the DHEA    2.  Call if sweating not improved    3.  If knee pain flares again call we will check a uric acid level

## 2020-01-29 NOTE — PROGRESS NOTES
Subjective   Shanae Alves is a 39 y.o. female.     Chief Complaint   Patient presents with   • Follow-up       History of Present Illness     Chronic health conditions:  Pedal edema: This has almost completely resolved since starting the levothyroxine  HLD: On atorvastatin 20mg daily  Depression: Stable on duloxetine 40mg (2 capsules of 20mg) daily and Wellbutrin SR 150mg BID  Weight gain over past 1 year: Went through a divorce, gained 60 pounds over the last 1 year. 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 Stopped smoking 1 year ago. Previously 2ppd. Back up to 1ppd.  Suboxone therapy: Will continue with another MD in Dr. Valencia's office.  She will need the gabapentin prescribed by our office and will fill out a controlled substance agreement today.  Obesity: She was started on metformin 500 mg twice daily for prediabetes, as well as DHEA to assist with weight loss.  She has noticed some hyperhidrosis above her usual level.    Other physicians currently involved in patient's care:  Dr. Mancilla, GYN    Acute complaints:  Shanae Alves is a 39 y.o. female who presents today to follow-up on her labs.  She had them drawn last week.  Her TSH has improved back down to normal range.  She has been able to lose about 10 pounds in the last 6 weeks including the holiday.    This patient is accompanied by their self who contributes to the history of their care.    The following portions of the patient's history were reviewed and updated as appropriate: allergies, current medications, past family history, past medical history, past social history, past surgical history and problem list.    Active Ambulatory Problems     Diagnosis Date Noted   • Family history of hypothyroidism 11/13/2019   • Personal history of tobacco use, presenting hazards to health 11/13/2019   • Subclinical hypothyroidism 11/20/2019   • Prediabetes 11/20/2019     Resolved Ambulatory Problems     Diagnosis Date Noted   • No Resolved  "Ambulatory Problems     Past Medical History:   Diagnosis Date   • Arthritis    • Depression    • Headache    • Hyperlipidemia    • Hypothyroidism      Past Surgical History:   Procedure Laterality Date   • CERVICAL CERCLAGE  2012     Family History   Problem Relation Age of Onset   • Arthritis Mother    • Hyperlipidemia Mother    • Thyroid disease Mother    • Arthritis Father      Social History     Socioeconomic History   • Marital status: Single     Spouse name: Not on file   • Number of children: Not on file   • Years of education: Not on file   • Highest education level: Not on file   Tobacco Use   • Smoking status: Current Every Day Smoker     Packs/day: 1.00     Types: Cigarettes   • Smokeless tobacco: Never Used   Substance and Sexual Activity   • Alcohol use: No     Frequency: Never   • Drug use: No         Review of Systems   Respiratory: Negative.    Musculoskeletal: Positive for arthralgias and joint swelling.       Objective   Blood pressure 124/70, pulse 88, height 172.7 cm (67.99\"), weight 115 kg (254 lb), SpO2 98 %.  Nursing note reviewed  Physical Exam  Const: NAD, A&Ox4, Pleasant, Cooperative  Eyes: EOMI, no conjunctivitis  ENT: No nasal discharge present, neck supple  Cardiac: Regular rate and rhythm, no cyanosis  Resp: Respiratory rate within normal limits, no increased work of breathing, no audible wheezing or retractions noted  GI: No distention or ascites  MSK: No significant pedal edema noted today.  Left knee shows 1+ effusion, tenderness to palpation peripatellar  Procedures  Assessment/Plan   Problem List Items Addressed This Visit        Endocrine    Subclinical hypothyroidism    Relevant Medications    levothyroxine (SYNTHROID, LEVOTHROID) 25 MCG tablet       Other    Prediabetes - Primary    Overview     A1c 6.11% November 2019           Other Visit Diagnoses     Pedal edema        Relevant Medications    levothyroxine (SYNTHROID, LEVOTHROID) 25 MCG tablet    Acute pain of left knee     "    Relevant Medications    meloxicam (MOBIC) 15 MG tablet    Polyarthralgia        Relevant Medications    meloxicam (MOBIC) 15 MG tablet    gabapentin (NEURONTIN) 300 MG capsule        #1 subclinical hypothyroidism  She feels symptomatically improved on levothyroxine 25 mcg daily, TSH has normalized.  Reasonable to continue this    #2 prediabetes  This is a new diagnosis.  We discussed the diagnosis and recommendations.  She may benefit from diabetic education and nutrition consultation.  -Continue metformin 500 mg twice daily  -Stop DHEA due to side effects    #3 dyssomnia  Discussed sleep hygiene, recommended melatonin 5 mg at night    4.  Weight gain/obesity  Obviously diet and calorie intake will be important.  She is typically drinking 2-3 regular sodas per day.  We discussed the caloric impact of these, and I recommended that she avoid all calorie-containing beverages going forward.  -She has lost about 10 pounds over the last 6 weeks, she was praised for her efforts and should continue    I have spent 25 minutes face-to-face with 15 minutes spent counseling the patient on the diagnoses, possible treatment options and outcomes, adjunctive and alternative treatments, and developing a care plan.    Patient Instructions   1.  Stop the DHEA    2.  Call if sweating not improved    3.  If knee pain flares again call we will check a uric acid level      Return in about 6 weeks (around 3/11/2020) for with A1c;.    Ambulatory progress note signed and attested to by Junior Lujan D.O.

## 2020-03-19 ENCOUNTER — OFFICE VISIT (OUTPATIENT)
Dept: FAMILY MEDICINE CLINIC | Facility: CLINIC | Age: 40
End: 2020-03-19

## 2020-03-19 VITALS
DIASTOLIC BLOOD PRESSURE: 84 MMHG | OXYGEN SATURATION: 98 % | BODY MASS INDEX: 39.1 KG/M2 | HEART RATE: 82 BPM | HEIGHT: 68 IN | SYSTOLIC BLOOD PRESSURE: 130 MMHG | WEIGHT: 258 LBS

## 2020-03-19 DIAGNOSIS — R60.0 PEDAL EDEMA: ICD-10-CM

## 2020-03-19 DIAGNOSIS — M25.562 ACUTE PAIN OF LEFT KNEE: ICD-10-CM

## 2020-03-19 DIAGNOSIS — R23.2 HOT FLASHES: ICD-10-CM

## 2020-03-19 DIAGNOSIS — R73.03 PREDIABETES: Primary | ICD-10-CM

## 2020-03-19 DIAGNOSIS — E03.8 SUBCLINICAL HYPOTHYROIDISM: ICD-10-CM

## 2020-03-19 DIAGNOSIS — M25.50 POLYARTHRALGIA: ICD-10-CM

## 2020-03-19 LAB — HBA1C MFR BLD: 5.8 %

## 2020-03-19 PROCEDURE — 99214 OFFICE O/P EST MOD 30 MIN: CPT | Performed by: FAMILY MEDICINE

## 2020-03-19 PROCEDURE — 83036 HEMOGLOBIN GLYCOSYLATED A1C: CPT | Performed by: FAMILY MEDICINE

## 2020-03-19 RX ORDER — DULOXETIN HYDROCHLORIDE 60 MG/1
60 CAPSULE, DELAYED RELEASE ORAL DAILY
Qty: 90 CAPSULE | Refills: 1 | Status: SHIPPED | OUTPATIENT
Start: 2020-03-19 | End: 2021-01-14 | Stop reason: SDUPTHER

## 2020-03-19 RX ORDER — ATORVASTATIN CALCIUM 20 MG/1
20 TABLET, FILM COATED ORAL DAILY
Qty: 90 TABLET | Refills: 1 | Status: SHIPPED | OUTPATIENT
Start: 2020-03-19 | End: 2020-09-22 | Stop reason: SDUPTHER

## 2020-03-19 RX ORDER — GABAPENTIN 300 MG/1
300 CAPSULE ORAL 2 TIMES DAILY
Qty: 180 CAPSULE | Refills: 1 | Status: SHIPPED | OUTPATIENT
Start: 2020-03-19 | End: 2020-09-22 | Stop reason: SDUPTHER

## 2020-03-19 RX ORDER — BUPROPION HYDROCHLORIDE 150 MG/1
TABLET, EXTENDED RELEASE ORAL
Qty: 270 TABLET | Refills: 1 | Status: SHIPPED | OUTPATIENT
Start: 2020-03-19 | End: 2020-09-22 | Stop reason: SDUPTHER

## 2020-03-19 RX ORDER — MELOXICAM 15 MG/1
15 TABLET ORAL DAILY
Qty: 180 TABLET | Refills: 1 | Status: SHIPPED | OUTPATIENT
Start: 2020-03-19 | End: 2021-01-14 | Stop reason: SDUPTHER

## 2020-03-19 RX ORDER — LEVOTHYROXINE SODIUM 0.03 MG/1
25 TABLET ORAL DAILY
Qty: 90 TABLET | Refills: 3 | Status: SHIPPED | OUTPATIENT
Start: 2020-03-19 | End: 2021-04-01

## 2020-03-19 NOTE — PATIENT INSTRUCTIONS
Estradiol; Norethindrone skin patches  What is this medicine?  ESTRADIOL; NORETHINDRONE (es tra DYE ole; nor eth IN drone) contains a mixture of female hormones. This medicine helps to relieve the symptoms of menopause like hot flashes, night sweats, mood changes, and vaginal dryness and irritation. It is also used to treat women with low estrogen levels or those who have had their ovaries removed.  This medicine may be used for other purposes; ask your health care provider or pharmacist if you have questions.  COMMON BRAND NAME(S): CombiPatch  What should I tell my health care provider before I take this medicine?  They need to know if you have any of these conditions:  -blood vessel disease or blood clots  -breast, cervical, endometrial, or uterine cancer  -diabetes  -endometriosis  -fibroids  -gallbladder disease  -heart disease or recent heart attack  -high blood cholesterol  -high blood pressure  -high level of calcium in the blood  -hysterectomy  -kidney disease  -liver disease  -mental depression  -migraine headaches  -porphyria  -stroke  -systemic lupus erythematosus (SLE)  -tobacco smoker  -vaginal bleeding  -an unusual or allergic reaction to estrogens, progestins, other medicines, foods, dyes, or preservatives  -pregnant or trying to get pregnant  -breast-feeding  How should I use this medicine?  This medicine is for external use only. Follow the directions on the prescription label. Use exactly as directed. Tear open the pouch, do not use scissors. Remove the stiff protective liner covering the adhesive. Try not to touch the adhesive. Apply the patch, sticky side to the skin, to an area of the lower abdomen that is clean, dry and hairless. Avoid injured, irritated, calloused, or scarred areas. Do not apply the skin patches to your breasts or around the waist area. Use a different site each time to prevent skin irritation. You should change your patch on the same days each week. Do not cut or trim the  patch. Do not stop using except on the advice of your doctor or health care professional.  Talk to your pediatrician regarding the use of this medicine in children. Special care may be needed.  A patient package insert for the product will be given with each prescription and refill. Read this sheet carefully each time. The sheet may change frequently.  Overdosage: If you think you have taken too much of this medicine contact a poison control center or emergency room at once.  NOTE: This medicine is only for you. Do not share this medicine with others.  What if I miss a dose?  If you forget to change your patch as scheduled, apply it as soon as possible. Remember to remove the old patch. If it is almost time to apply the next patch, skip the missed patch and get back on your normal schedule. Do not wear more than one patch at a time unless you are told to do so by your doctor or health care professional.  What may interact with this medicine?  Do not take this medicine with any of the following medications:  -aromatase inhibitors like aminoglutethimide, anastrozole, exemestane, letrozole, testolactone  This medicine may also interact with the following medications:  -barbiturates, such as phenobarbital  -benzodiazepines  -bosentan  -bromocriptine  -carbamazepine  -cimetidine  -cyclosporine  -dantrolene  -grapefruit juice  -griseofulvin  -hydrocortisone, cortisone, or prednisolone  -isoniazid (INH)  -medications for diabetes  -methotrexate  -mineral oil  -phenytoin  -raloxifene  -rifabutin, rifampin, or rifapentine  -tamoxifen  -thyroid hormones  -topiramate  -tricyclic antidepressants  -warfarin  This list may not describe all possible interactions. Give your health care provider a list of all the medicines, herbs, non-prescription drugs, or dietary supplements you use. Also tell them if you smoke, drink alcohol, or use illegal drugs. Some items may interact with your medicine.  What should I watch for while using  this medicine?  Visit your health care professional for regular checks on your progress. You should have a complete check-up every 6 months. You will need a regular breast and pelvic exam. You should also discuss the need for regular mammograms with your health care professional, and follow his or her guidelines.  This medicine can make your body retain fluid, making your fingers, hands, or ankles swell. Your blood pressure can go up. Contact your doctor or health care professional if you feel you are retaining fluid.  If you have any reason to think you are pregnant; stop taking this medicine at once and contact your doctor or health care professional.  Tobacco smoking increases the risk of getting a blood clot or having a stroke, especially if you are more than 35 years old. You are strongly advised not to smoke.  If you wear contact lenses and notice visual changes, or if the lenses begin to feel uncomfortable, consult your eye care specialist.  If you are going to have elective surgery, you may need to stop taking this medicine beforehand. Consult your health care professional for advice prior to scheduling the surgery.  If you are going to have a MRI procedure, let your MRI technician know about the use of these patches. Some drug patches contain an aluminized backing that can become heated when exposed to MRI and may cause burns. You may need to temporarily remove the patch during the MRI procedure.  You may bathe or participate in other activities while wearing your patch. If your patch falls off reapply it. If you cannot reapply the patch, apply a new patch to another area and continue to follow your usual dose schedule.  What side effects may I notice from receiving this medicine?  Side effects that you should report to your doctor or health care professional as soon as possible:  -allergic reactions like skin rash, itching or hives, swelling of the face, lips, or tongue  -breast tissue changes or  discharge  -changes in vision  -chest pain  -confusion, trouble speaking or understanding  -dark urine  -general ill feeling or flu-like symptoms  -light-colored stools  -nausea, vomiting  -pain, swelling, warmth in the leg  -right upper belly pain  -severe headaches  -shortness of breath  -sudden numbness or weakness of the face, arm or leg  -trouble walking, dizziness, loss of balance or coordination  -unusual vaginal bleeding  -yellowing of the eyes or skin  Side effects that usually do not require medical attention (report to your doctor or health care professional if they continue or are bothersome):  -acne  -brown spots on the face  -change in appetite  -change in sexual desire  -depressed mood or mood swings  -fluid retention and swelling  -stomach cramps or bloating  -unusually weak or tired  -weight gain  This list may not describe all possible side effects. Call your doctor for medical advice about side effects. You may report side effects to FDA at 9-910-FDA-1897.  Where should I keep my medicine?  Keep out of the reach of children.  Store at room temperature between 15 and 30 degrees C (59 and 86 degrees F) in the sealed foil pouch. Throw away any unused medicine after 6 months or the expiration date on the package, whichever is sooner.  NOTE: This sheet is a summary. It may not cover all possible information. If you have questions about this medicine, talk to your doctor, pharmacist, or health care provider.  © 2020 [employer] (2017-07-11 12:55:43)

## 2020-03-31 NOTE — PROGRESS NOTES
Subjective   Shanae Alves is a 39 y.o. female.     Chief Complaint   Patient presents with   • Follow-up       History of Present Illness     Chronic health conditions:  Pedal edema: This has almost completely resolved since starting the levothyroxine  HLD: On atorvastatin 20mg daily  Depression: Stable on duloxetine 40mg (2 capsules of 20mg) daily and Wellbutrin SR 150mg BID  Weight gain over past 1 year: Went through a divorce, gained 60 pounds over the last 1 year. 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 Stopped smoking 1 year ago. Previously 2ppd. Back up to 1ppd.  Suboxone therapy: Will continue with another MD in Dr. Valencia's office.  Gabapentin will be prescribed by our office.  Obesity: She was started on metformin 500 mg twice daily for prediabetes, as well as DHEA to assist with weight loss.    Other physicians currently involved in patient's care:  Dr. Mancilla, GYN    Acute complaints:  Shanae Alves is a 39 y.o. female who presents today to follow-up on the above issues. She has had a slight rebound in her weight she attributes to stress.    This patient is accompanied by their self who contributes to the history of their care.    The following portions of the patient's history were reviewed and updated as appropriate: allergies, current medications, past family history, past medical history, past social history, past surgical history and problem list.    Active Ambulatory Problems     Diagnosis Date Noted   • Family history of hypothyroidism 11/13/2019   • Personal history of tobacco use, presenting hazards to health 11/13/2019   • Subclinical hypothyroidism 11/20/2019   • Prediabetes 11/20/2019     Resolved Ambulatory Problems     Diagnosis Date Noted   • No Resolved Ambulatory Problems     Past Medical History:   Diagnosis Date   • Arthritis    • Depression    • Headache    • Hyperlipidemia    • Hypothyroidism      Past Surgical History:   Procedure Laterality Date   • CERVICAL  "[facility]  2012     Family History   Problem Relation Age of Onset   • Arthritis Mother    • Hyperlipidemia Mother    • Thyroid disease Mother    • Arthritis Father      Social History     Socioeconomic History   • Marital status: Single     Spouse name: Not on file   • Number of children: Not on file   • Years of education: Not on file   • Highest education level: Not on file   Tobacco Use   • Smoking status: Current Every Day Smoker     Packs/day: 1.00     Types: Cigarettes   • Smokeless tobacco: Never Used   Substance and Sexual Activity   • Alcohol use: No     Frequency: Never   • Drug use: No         Review of Systems   Respiratory: Negative.    Musculoskeletal: Positive for arthralgias and joint swelling.       Objective   Blood pressure 130/84, pulse 82, height 172.7 cm (67.99\"), weight 117 kg (258 lb), SpO2 98 %.  Nursing note reviewed  Physical Exam  Const: NAD, A&Ox4, Pleasant, Cooperative  Eyes: EOMI, no conjunctivitis  ENT: No nasal discharge present, neck supple  Cardiac: Regular rate and rhythm, no cyanosis  Resp: Respiratory rate within normal limits, no increased work of breathing, no audible wheezing or retractions noted  GI: No distention or ascites  MSK: No significant pedal edema noted today.  Left knee shows 1+ effusion, tenderness to palpation peripatellar  Procedures  Assessment/Plan   Problem List Items Addressed This Visit        Endocrine    Subclinical hypothyroidism    Relevant Medications    levothyroxine (SYNTHROID, LEVOTHROID) 25 MCG tablet    Prediabetes - Primary    Overview     A1c 6.11% November 2019         Relevant Medications    metFORMIN (Glucophage) 500 MG tablet    Other Relevant Orders    POC Glycosylated Hemoglobin (Hb A1C) (Completed)      Other Visit Diagnoses     Acute pain of left knee        Relevant Medications    meloxicam (MOBIC) 15 MG tablet    Polyarthralgia        Relevant Medications    meloxicam (MOBIC) 15 MG tablet    gabapentin (NEURONTIN) 300 MG capsule    " Pedal edema        Relevant Medications    levothyroxine (SYNTHROID, LEVOTHROID) 25 MCG tablet    Hot flashes        Relevant Medications    estradiol (CLIMARA) 0.025 MG/24HR patch        #1 subclinical hypothyroidism  She feels symptomatically improved on levothyroxine 25 mcg daily, TSH has normalized.  Reasonable to continue this    #2 prediabetes  This is a new diagnosis as of November.  We discussed the diagnosis and recommendations.  She may benefit from diabetic education and nutrition consultation.  -Continue metformin 500 mg twice daily. She has improved from 6.11 to 5.8% today  Lab Results   Component Value Date    HGBA1C 5.8 03/19/2020     #3 dyssomnia  Discussed sleep hygiene, recommended melatonin 5 mg at night    4.  Weight gain/obesity  Obviously diet and calorie intake will be important.  She is typically drinking 2-3 regular sodas per day.  We discussed the caloric impact of these, and I recommended that she avoid all calorie-containing beverages going forward.  -She has lost about 10 pounds over the last 6 weeks, she was praised for her efforts and should continue. Stress gained a few pounds but has been less active.    5.  Hot flashes  Trial of estrogen patches. Discussed risks/benefits of treatment, symptoms are severely impacting her QOL    I have spent 25 minutes face-to-face with 15 minutes spent counseling the patient on the diagnoses, possible treatment options and outcomes, adjunctive and alternative treatments, and developing a care plan.    Patient Instructions   Estradiol; Norethindrone skin patches  What is this medicine?  ESTRADIOL; NORETHINDRONE (es tra DYE ole; nor eth IN drone) contains a mixture of female hormones. This medicine helps to relieve the symptoms of menopause like hot flashes, night sweats, mood changes, and vaginal dryness and irritation. It is also used to treat women with low estrogen levels or those who have had their ovaries removed.  This medicine may be used for  other purposes; ask your health care provider or pharmacist if you have questions.  COMMON BRAND NAME(S): CombiPatch  What should I tell my health care provider before I take this medicine?  They need to know if you have any of these conditions:  -blood vessel disease or blood clots  -breast, cervical, endometrial, or uterine cancer  -diabetes  -endometriosis  -fibroids  -gallbladder disease  -heart disease or recent heart attack  -high blood cholesterol  -high blood pressure  -high level of calcium in the blood  -hysterectomy  -kidney disease  -liver disease  -mental depression  -migraine headaches  -porphyria  -stroke  -systemic lupus erythematosus (SLE)  -tobacco smoker  -vaginal bleeding  -an unusual or allergic reaction to estrogens, progestins, other medicines, foods, dyes, or preservatives  -pregnant or trying to get pregnant  -breast-feeding  How should I use this medicine?  This medicine is for external use only. Follow the directions on the prescription label. Use exactly as directed. Tear open the pouch, do not use scissors. Remove the stiff protective liner covering the adhesive. Try not to touch the adhesive. Apply the patch, sticky side to the skin, to an area of the lower abdomen that is clean, dry and hairless. Avoid injured, irritated, calloused, or scarred areas. Do not apply the skin patches to your breasts or around the waist area. Use a different site each time to prevent skin irritation. You should change your patch on the same days each week. Do not cut or trim the patch. Do not stop using except on the advice of your doctor or health care professional.  Talk to your pediatrician regarding the use of this medicine in children. Special care may be needed.  A patient package insert for the product will be given with each prescription and refill. Read this sheet carefully each time. The sheet may change frequently.  Overdosage: If you think you have taken too much of this medicine contact a  poison control center or emergency room at once.  NOTE: This medicine is only for you. Do not share this medicine with others.  What if I miss a dose?  If you forget to change your patch as scheduled, apply it as soon as possible. Remember to remove the old patch. If it is almost time to apply the next patch, skip the missed patch and get back on your normal schedule. Do not wear more than one patch at a time unless you are told to do so by your doctor or health care professional.  What may interact with this medicine?  Do not take this medicine with any of the following medications:  -aromatase inhibitors like aminoglutethimide, anastrozole, exemestane, letrozole, testolactone  This medicine may also interact with the following medications:  -barbiturates, such as phenobarbital  -benzodiazepines  -bosentan  -bromocriptine  -carbamazepine  -cimetidine  -cyclosporine  -dantrolene  -grapefruit juice  -griseofulvin  -hydrocortisone, cortisone, or prednisolone  -isoniazid (INH)  -medications for diabetes  -methotrexate  -mineral oil  -phenytoin  -raloxifene  -rifabutin, rifampin, or rifapentine  -tamoxifen  -thyroid hormones  -topiramate  -tricyclic antidepressants  -warfarin  This list may not describe all possible interactions. Give your health care provider a list of all the medicines, herbs, non-prescription drugs, or dietary supplements you use. Also tell them if you smoke, drink alcohol, or use illegal drugs. Some items may interact with your medicine.  What should I watch for while using this medicine?  Visit your health care professional for regular checks on your progress. You should have a complete check-up every 6 months. You will need a regular breast and pelvic exam. You should also discuss the need for regular mammograms with your health care professional, and follow his or her guidelines.  This medicine can make your body retain fluid, making your fingers, hands, or ankles swell. Your blood pressure can  go up. Contact your doctor or health care professional if you feel you are retaining fluid.  If you have any reason to think you are pregnant; stop taking this medicine at once and contact your doctor or health care professional.  Tobacco smoking increases the risk of getting a blood clot or having a stroke, especially if you are more than 35 years old. You are strongly advised not to smoke.  If you wear contact lenses and notice visual changes, or if the lenses begin to feel uncomfortable, consult your eye care specialist.  If you are going to have elective surgery, you may need to stop taking this medicine beforehand. Consult your health care professional for advice prior to scheduling the surgery.  If you are going to have a MRI procedure, let your MRI technician know about the use of these patches. Some drug patches contain an aluminized backing that can become heated when exposed to MRI and may cause burns. You may need to temporarily remove the patch during the MRI procedure.  You may bathe or participate in other activities while wearing your patch. If your patch falls off reapply it. If you cannot reapply the patch, apply a new patch to another area and continue to follow your usual dose schedule.  What side effects may I notice from receiving this medicine?  Side effects that you should report to your doctor or health care professional as soon as possible:  -allergic reactions like skin rash, itching or hives, swelling of the face, lips, or tongue  -breast tissue changes or discharge  -changes in vision  -chest pain  -confusion, trouble speaking or understanding  -dark urine  -general ill feeling or flu-like symptoms  -light-colored stools  -nausea, vomiting  -pain, swelling, warmth in the leg  -right upper belly pain  -severe headaches  -shortness of breath  -sudden numbness or weakness of the face, arm or leg  -trouble walking, dizziness, loss of balance or coordination  -unusual vaginal  bleeding  -yellowing of the eyes or skin  Side effects that usually do not require medical attention (report to your doctor or health care professional if they continue or are bothersome):  -acne  -brown spots on the face  -change in appetite  -change in sexual desire  -depressed mood or mood swings  -fluid retention and swelling  -stomach cramps or bloating  -unusually weak or tired  -weight gain  This list may not describe all possible side effects. Call your doctor for medical advice about side effects. You may report side effects to FDA at 4-391-FDA-0831.  Where should I keep my medicine?  Keep out of the reach of children.  Store at room temperature between 15 and 30 degrees C (59 and 86 degrees F) in the sealed foil pouch. Throw away any unused medicine after 6 months or the expiration date on the package, whichever is sooner.  NOTE: This sheet is a summary. It may not cover all possible information. If you have questions about this medicine, talk to your doctor, pharmacist, or health care provider.  © 2020 [employer] (2017-07-11 12:55:43)        Return in about 2 weeks (around 4/2/2020).    Ambulatory progress note signed and attested to by Junior Lujan D.O.

## 2020-04-16 ENCOUNTER — TELEMEDICINE (OUTPATIENT)
Dept: FAMILY MEDICINE CLINIC | Facility: CLINIC | Age: 40
End: 2020-04-16

## 2020-04-16 DIAGNOSIS — M25.562 ACUTE PAIN OF LEFT KNEE: ICD-10-CM

## 2020-04-16 DIAGNOSIS — R23.2 HOT FLASHES: Primary | ICD-10-CM

## 2020-04-16 PROCEDURE — 99214 OFFICE O/P EST MOD 30 MIN: CPT | Performed by: FAMILY MEDICINE

## 2020-04-16 RX ORDER — ESTRADIOL 0.04 MG/D
1 PATCH TRANSDERMAL WEEKLY
Qty: 4 EACH | Refills: 2 | Status: SHIPPED | OUTPATIENT
Start: 2020-04-16 | End: 2021-01-14

## 2020-04-16 NOTE — PROGRESS NOTES
Subjective   Shanae Alves is a 39 y.o. female.     Chief Complaint   Patient presents with   • Follow-up       History of Present Illness     Shanae Alves presents today for   Chief Complaint   Patient presents with   • Follow-up     Chronic health conditions:  Pedal edema: This has almost completely resolved since starting the levothyroxine  HLD: On atorvastatin 20mg daily  Depression: Stable on duloxetine 40mg (2 capsules of 20mg) daily and Wellbutrin SR 150mg BID  Weight gain over past 1 year: Went through a divorce, gained 60 pounds over the last 1 year. 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 Stopped smoking 1 year ago. Previously 2ppd. Back up to 1ppd.  Suboxone therapy: Will continue with another MD in Dr. Valencia's office.  Gabapentin will be prescribed by our office.  Obesity: She was started on metformin 500 mg twice daily for prediabetes, as well as DHEA to assist with weight loss.    Hot flashes/sweating have improved about 40% better on the low dose Climara.    Knee OA: Meloxicam has stopped working. Not walking right now due to pain. Pain is around top of kneecap and sides.    Other physicians currently involved in patient's care:  Dr. Mancilla, GYN    You have chosen to receive care through a telehealth visit.  Do you consent to use a video/audio connection for your medical care today? Yes    The following portions of the patient's history were reviewed and updated as appropriate: allergies, current medications, past family history, past medical history, past social history, past surgical history and problem list.    Active Ambulatory Problems     Diagnosis Date Noted   • Family history of hypothyroidism 11/13/2019   • Personal history of tobacco use, presenting hazards to health 11/13/2019   • Subclinical hypothyroidism 11/20/2019   • Prediabetes 11/20/2019     Resolved Ambulatory Problems     Diagnosis Date Noted   • No Resolved Ambulatory Problems     Past Medical History:    Diagnosis Date   • Arthritis    • Depression    • Headache    • Hyperlipidemia    • Hypothyroidism      Past Surgical History:   Procedure Laterality Date   • CERVICAL CERCLAGE  2012     Family History   Problem Relation Age of Onset   • Arthritis Mother    • Hyperlipidemia Mother    • Thyroid disease Mother    • Arthritis Father      Social History     Socioeconomic History   • Marital status: Single     Spouse name: Not on file   • Number of children: Not on file   • Years of education: Not on file   • Highest education level: Not on file   Tobacco Use   • Smoking status: Current Every Day Smoker     Packs/day: 1.00     Types: Cigarettes   • Smokeless tobacco: Never Used   Substance and Sexual Activity   • Alcohol use: No     Frequency: Never   • Drug use: No       Review of Systems  Review of Systems -  General ROS: negative for - chills, fever or night sweats  Cardiovascular ROS: no chest pain or dyspnea on exertion  Gastrointestinal ROS: no abdominal pain, change in bowel habits, or black or bloody stools  Genito-Urinary ROS: no dysuria, trouble voiding, or hematuria    Objective   There were no vitals taken for this visit.  Vitals obtained from patient if available  Physical Exam  Const: Non-toxic appearing, NAD, A&Ox4, Pleasant, Cooperative  Eyes: EOMI, no conjunctivitis  ENT: No copious nasal drainage noted  Cardiac: Regular rate by pulse  Resp: Respiratory rate observed to be within normal limits, no increased work of breathing observed, no audible wheezing or cough noted  Psych: Appropriate mood and behavior.  Procedures  Assessment/Plan   Problem List Items Addressed This Visit     None      Visit Diagnoses     Hot flashes    -  Primary    Relevant Medications    estradiol (CLIMARA) 0.0375 MG/24HR    Acute pain of left knee        Relevant Medications    diclofenac (VOLTAREN) 50 MG EC tablet        #1 subclinical hypothyroidism  She feels symptomatically improved on levothyroxine 25 mcg daily, TSH has  normalized.  Reasonable to continue this     #2 prediabetes  This is a new diagnosis as of November.  We discussed the diagnosis and recommendations.  She may benefit from diabetic education and nutrition consultation.  -Continue metformin 500 mg twice daily. She has improved from 6.11 to 5.8% at her last visit     #3 dyssomnia  Discussed sleep hygiene, recommended melatonin 5 mg at night     4.  Weight gain/obesity  Obviously diet and calorie intake will be important.  She is typically drinking 2-3 regular sodas per day.  We discussed the caloric impact of these, and I recommended that she avoid all calorie-containing beverages going forward.  -She has lost about 10 pounds over the last 6 weeks, she was praised for her efforts and should continue. Stress gained a few pounds but has been less active.     5.  Hot flashes  Trial of estrogen patches did well about 40% improved. I would like her to slightly increase her dosage to 0.0375 per week. Discussed risks/benefits of treatment, symptoms are severely impacting her QOL    6.  Knee pain  Recommended exercises, cycle to diclofenac from meloxicam  Follow up in 6 weeks video visit    See patient diagnoses and orders along with patient instructions for assessment, plan, and changes to care for patient.    This visit was conducted via telemedicine with live video and audio provided through Video Options: MyChart/Zoom at the point of care.    Patient Instructions   Knee Exercises  Ask your health care provider which exercises are safe for you. Do exercises exactly as told by your health care provider and adjust them as directed. It is normal to feel mild stretching, pulling, tightness, or discomfort as you do these exercises. Stop right away if you feel sudden pain or your pain gets worse. Do not begin these exercises until told by your health care provider.  Stretching and range-of-motion exercises  These exercises warm up your muscles and joints and improve the movement  and flexibility of your knee. These exercises also help to relieve pain and swelling.  Knee extension, prone  1. Lie on your abdomen (prone position) on a bed.  2. Place your left / right knee just beyond the edge of the surface so your knee is not on the bed. You can put a towel under your left / right thigh just above your kneecap for comfort.  3. Relax your leg muscles and allow gravity to straighten your knee (extension). You should feel a stretch behind your left / right knee.  4. Hold this position for __________ seconds.  5. Scoot up so your knee is supported between repetitions.  Repeat __________ times. Complete this exercise __________ times a day.  Knee flexion, active    1. Lie on your back with both legs straight. If this causes back discomfort, bend your left / right knee so your foot is flat on the floor.  2. Slowly slide your left / right heel back toward your buttocks. Stop when you feel a gentle stretch in the front of your knee or thigh (flexion).  3. Hold this position for __________ seconds.  4. Slowly slide your left / right heel back to the starting position.  Repeat __________ times. Complete this exercise __________ times a day.  Quadriceps stretch, prone    1. Lie on your abdomen on a firm surface, such as a bed or padded floor.  2. Bend your left / right knee and hold your ankle. If you cannot reach your ankle or pant leg, loop a belt around your foot and grab the belt instead.  3. Gently pull your heel toward your buttocks. Your knee should not slide out to the side. You should feel a stretch in the front of your thigh and knee (quadriceps).  4. Hold this position for __________ seconds.  Repeat __________ times. Complete this exercise __________ times a day.  Hamstring, supine  1. Lie on your back (supine position).  2. Loop a belt or towel over the ball of your left / right foot. The ball of your foot is on the walking surface, right under your toes.  3. Straighten your left / right  knee and slowly pull on the belt to raise your leg until you feel a gentle stretch behind your knee (hamstring).  ? Do not let your knee bend while you do this.  ? Keep your other leg flat on the floor.  4. Hold this position for __________ seconds.  Repeat __________ times. Complete this exercise __________ times a day.  Strengthening exercises  These exercises build strength and endurance in your knee. Endurance is the ability to use your muscles for a long time, even after they get tired.  Quadriceps, isometric  This exercise stretches the muscles in front of your thigh (quadriceps) without moving your knee joint (isometric).  1. Lie on your back with your left / right leg extended and your other knee bent. Put a rolled towel or small pillow under your knee if told by your health care provider.  2. Slowly tense the muscles in the front of your left / right thigh. You should see your kneecap slide up toward your hip or see increased dimpling just above the knee. This motion will push the back of the knee toward the floor.  3. For __________ seconds, hold the muscle as tight as you can without increasing your pain.  4. Relax the muscles slowly and completely.  Repeat __________ times. Complete this exercise __________ times a day.  Straight leg raises  This exercise stretches the muscles in front of your thigh (quadriceps) and the muscles that move your hips (hip flexors).  1. Lie on your back with your left / right leg extended and your other knee bent.  2. Tense the muscles in the front of your left / right thigh. You should see your kneecap slide up or see increased dimpling just above the knee. Your thigh may even shake a bit.  3. Keep these muscles tight as you raise your leg 4-6 inches (10-15 cm) off the floor. Do not let your knee bend.  4. Hold this position for __________ seconds.  5. Keep these muscles tense as you lower your leg.  6. Relax your muscles slowly and completely after each repetition.  Repeat  "__________ times. Complete this exercise __________ times a day.  Hamstring, isometric  1. Lie on your back on a firm surface.  2. Bend your left / right knee about __________ degrees.  3. Dig your left / right heel into the surface as if you are trying to pull it toward your buttocks. Tighten the muscles in the back of your thighs (hamstring) to \"dig\" as hard as you can without increasing any pain.  4. Hold this position for __________ seconds.  5. Release the tension gradually and allow your muscles to relax completely for __________ seconds after each repetition.  Repeat __________ times. Complete this exercise __________ times a day.  Hamstring curls  If told by your health care provider, do this exercise while wearing ankle weights. Begin with __________ lb weights. Then increase the weight by 1 lb (0.5 kg) increments. Do not wear ankle weights that are more than __________ lb.  1. Lie on your abdomen with your legs straight.  2. Bend your left / right knee as far as you can without feeling pain. Keep your hips flat against the floor.  3. Hold this position for __________ seconds.  4. Slowly lower your leg to the starting position.  Repeat __________ times. Complete this exercise __________ times a day.  Squats  This exercise strengthens the muscles in front of your thigh and knee (quadriceps).  1.  front of a table, with your feet and knees pointing straight ahead. You may rest your hands on the table for balance but not for support.  2. Slowly bend your knees and lower your hips like you are going to sit in a chair.  ? Keep your weight over your heels, not over your toes.  ? Keep your lower legs upright so they are parallel with the table legs.  ? Do not let your hips go lower than your knees.  ? Do not bend lower than told by your health care provider.  ? If your knee pain increases, do not bend as low.  3. Hold the squat position for __________ seconds.  4. Slowly push with your legs to return to " standing. Do not use your hands to pull yourself to standing.  Repeat __________ times. Complete this exercise __________ times a day.  Wall slides  This exercise strengthens the muscles in front of your thigh and knee (quadriceps).  1. Lean your back against a smooth wall or door, and walk your feet out 18-24 inches (46-61 cm) from it.  2. Place your feet hip-width apart.  3. Slowly slide down the wall or door until your knees bend __________ degrees. Keep your knees over your heels, not over your toes. Keep your knees in line with your hips.  4. Hold this position for __________ seconds.  Repeat __________ times. Complete this exercise __________ times a day.  Straight leg raises  This exercise strengthens the muscles that rotate the leg at the hip and move it away from your body (hip abductors).  1. Lie on your side with your left / right leg in the top position. Lie so your head, shoulder, knee, and hip line up. You may bend your bottom knee to help you keep your balance.  2. Roll your hips slightly forward so your hips are stacked directly over each other and your left / right knee is facing forward.  3. Leading with your heel, lift your top leg 4-6 inches (10-15 cm). You should feel the muscles in your outer hip lifting.  ? Do not let your foot drift forward.  ? Do not let your knee roll toward the ceiling.  4. Hold this position for __________ seconds.  5. Slowly return your leg to the starting position.  6. Let your muscles relax completely after each repetition.  Repeat __________ times. Complete this exercise __________ times a day.  Straight leg raises  This exercise stretches the muscles that move your hips away from the front of the pelvis (hip extensors).  1. Lie on your abdomen on a firm surface. You can put a pillow under your hips if that is more comfortable.  2. Tense the muscles in your buttocks and lift your left / right leg about 4-6 inches (10-15 cm). Keep your knee straight as you lift your  leg.  3. Hold this position for __________ seconds.  4. Slowly lower your leg to the starting position.  5. Let your leg relax completely after each repetition.  Repeat __________ times. Complete this exercise __________ times a day.  This information is not intended to replace advice given to you by your health care provider. Make sure you discuss any questions you have with your health care provider.  Document Released: 11/01/2006 Document Revised: 10/08/2019 Document Reviewed: 10/08/2019  [employer] Interactive Patient Education © 2020 [employer]        Return in about 6 weeks (around 5/28/2020) for video visit.    Ambulatory progress note signed and attested to by Junior Lujan D.O.

## 2020-04-16 NOTE — PATIENT INSTRUCTIONS
Knee Exercises  Ask your health care provider which exercises are safe for you. Do exercises exactly as told by your health care provider and adjust them as directed. It is normal to feel mild stretching, pulling, tightness, or discomfort as you do these exercises. Stop right away if you feel sudden pain or your pain gets worse. Do not begin these exercises until told by your health care provider.  Stretching and range-of-motion exercises  These exercises warm up your muscles and joints and improve the movement and flexibility of your knee. These exercises also help to relieve pain and swelling.  Knee extension, prone  1. Lie on your abdomen (prone position) on a bed.  2. Place your left / right knee just beyond the edge of the surface so your knee is not on the bed. You can put a towel under your left / right thigh just above your kneecap for comfort.  3. Relax your leg muscles and allow gravity to straighten your knee (extension). You should feel a stretch behind your left / right knee.  4. Hold this position for __________ seconds.  5. Scoot up so your knee is supported between repetitions.  Repeat __________ times. Complete this exercise __________ times a day.  Knee flexion, active    1. Lie on your back with both legs straight. If this causes back discomfort, bend your left / right knee so your foot is flat on the floor.  2. Slowly slide your left / right heel back toward your buttocks. Stop when you feel a gentle stretch in the front of your knee or thigh (flexion).  3. Hold this position for __________ seconds.  4. Slowly slide your left / right heel back to the starting position.  Repeat __________ times. Complete this exercise __________ times a day.  Quadriceps stretch, prone    1. Lie on your abdomen on a firm surface, such as a bed or padded floor.  2. Bend your left / right knee and hold your ankle. If you cannot reach your ankle or pant leg, loop a belt around your foot and grab the belt  instead.  3. Gently pull your heel toward your buttocks. Your knee should not slide out to the side. You should feel a stretch in the front of your thigh and knee (quadriceps).  4. Hold this position for __________ seconds.  Repeat __________ times. Complete this exercise __________ times a day.  Hamstring, supine  1. Lie on your back (supine position).  2. Loop a belt or towel over the ball of your left / right foot. The ball of your foot is on the walking surface, right under your toes.  3. Straighten your left / right knee and slowly pull on the belt to raise your leg until you feel a gentle stretch behind your knee (hamstring).  ? Do not let your knee bend while you do this.  ? Keep your other leg flat on the floor.  4. Hold this position for __________ seconds.  Repeat __________ times. Complete this exercise __________ times a day.  Strengthening exercises  These exercises build strength and endurance in your knee. Endurance is the ability to use your muscles for a long time, even after they get tired.  Quadriceps, isometric  This exercise stretches the muscles in front of your thigh (quadriceps) without moving your knee joint (isometric).  1. Lie on your back with your left / right leg extended and your other knee bent. Put a rolled towel or small pillow under your knee if told by your health care provider.  2. Slowly tense the muscles in the front of your left / right thigh. You should see your kneecap slide up toward your hip or see increased dimpling just above the knee. This motion will push the back of the knee toward the floor.  3. For __________ seconds, hold the muscle as tight as you can without increasing your pain.  4. Relax the muscles slowly and completely.  Repeat __________ times. Complete this exercise __________ times a day.  Straight leg raises  This exercise stretches the muscles in front of your thigh (quadriceps) and the muscles that move your hips (hip flexors).  1. Lie on your back with  "your left / right leg extended and your other knee bent.  2. Tense the muscles in the front of your left / right thigh. You should see your kneecap slide up or see increased dimpling just above the knee. Your thigh may even shake a bit.  3. Keep these muscles tight as you raise your leg 4-6 inches (10-15 cm) off the floor. Do not let your knee bend.  4. Hold this position for __________ seconds.  5. Keep these muscles tense as you lower your leg.  6. Relax your muscles slowly and completely after each repetition.  Repeat __________ times. Complete this exercise __________ times a day.  Hamstring, isometric  1. Lie on your back on a firm surface.  2. Bend your left / right knee about __________ degrees.  3. Dig your left / right heel into the surface as if you are trying to pull it toward your buttocks. Tighten the muscles in the back of your thighs (hamstring) to \"dig\" as hard as you can without increasing any pain.  4. Hold this position for __________ seconds.  5. Release the tension gradually and allow your muscles to relax completely for __________ seconds after each repetition.  Repeat __________ times. Complete this exercise __________ times a day.  Hamstring curls  If told by your health care provider, do this exercise while wearing ankle weights. Begin with __________ lb weights. Then increase the weight by 1 lb (0.5 kg) increments. Do not wear ankle weights that are more than __________ lb.  1. Lie on your abdomen with your legs straight.  2. Bend your left / right knee as far as you can without feeling pain. Keep your hips flat against the floor.  3. Hold this position for __________ seconds.  4. Slowly lower your leg to the starting position.  Repeat __________ times. Complete this exercise __________ times a day.  Squats  This exercise strengthens the muscles in front of your thigh and knee (quadriceps).  1.  front of a table, with your feet and knees pointing straight ahead. You may rest your " hands on the table for balance but not for support.  2. Slowly bend your knees and lower your hips like you are going to sit in a chair.  ? Keep your weight over your heels, not over your toes.  ? Keep your lower legs upright so they are parallel with the table legs.  ? Do not let your hips go lower than your knees.  ? Do not bend lower than told by your health care provider.  ? If your knee pain increases, do not bend as low.  3. Hold the squat position for __________ seconds.  4. Slowly push with your legs to return to standing. Do not use your hands to pull yourself to standing.  Repeat __________ times. Complete this exercise __________ times a day.  Wall slides  This exercise strengthens the muscles in front of your thigh and knee (quadriceps).  1. Lean your back against a smooth wall or door, and walk your feet out 18-24 inches (46-61 cm) from it.  2. Place your feet hip-width apart.  3. Slowly slide down the wall or door until your knees bend __________ degrees. Keep your knees over your heels, not over your toes. Keep your knees in line with your hips.  4. Hold this position for __________ seconds.  Repeat __________ times. Complete this exercise __________ times a day.  Straight leg raises  This exercise strengthens the muscles that rotate the leg at the hip and move it away from your body (hip abductors).  1. Lie on your side with your left / right leg in the top position. Lie so your head, shoulder, knee, and hip line up. You may bend your bottom knee to help you keep your balance.  2. Roll your hips slightly forward so your hips are stacked directly over each other and your left / right knee is facing forward.  3. Leading with your heel, lift your top leg 4-6 inches (10-15 cm). You should feel the muscles in your outer hip lifting.  ? Do not let your foot drift forward.  ? Do not let your knee roll toward the ceiling.  4. Hold this position for __________ seconds.  5. Slowly return your leg to the  starting position.  6. Let your muscles relax completely after each repetition.  Repeat __________ times. Complete this exercise __________ times a day.  Straight leg raises  This exercise stretches the muscles that move your hips away from the front of the pelvis (hip extensors).  1. Lie on your abdomen on a firm surface. You can put a pillow under your hips if that is more comfortable.  2. Tense the muscles in your buttocks and lift your left / right leg about 4-6 inches (10-15 cm). Keep your knee straight as you lift your leg.  3. Hold this position for __________ seconds.  4. Slowly lower your leg to the starting position.  5. Let your leg relax completely after each repetition.  Repeat __________ times. Complete this exercise __________ times a day.  This information is not intended to replace advice given to you by your health care provider. Make sure you discuss any questions you have with your health care provider.  Document Released: 11/01/2006 Document Revised: 10/08/2019 Document Reviewed: 10/08/2019  [employer] Interactive Patient Education © 2020 [employer]

## 2020-06-05 ENCOUNTER — PATIENT MESSAGE (OUTPATIENT)
Dept: FAMILY MEDICINE CLINIC | Facility: CLINIC | Age: 40
End: 2020-06-05

## 2020-06-08 RX ORDER — ALBUTEROL SULFATE 90 UG/1
2 AEROSOL, METERED RESPIRATORY (INHALATION) EVERY 6 HOURS PRN
Qty: 1 INHALER | Refills: 2 | Status: SHIPPED | OUTPATIENT
Start: 2020-06-08 | End: 2020-09-23 | Stop reason: SDUPTHER

## 2020-06-08 NOTE — TELEPHONE ENCOUNTER
From: [facility]  To: Junior Lujan DO  Sent: 6/5/2020 8:10 PM EDT  Subject: Prescription Question    Hi. When I saw Dr. Lujan in the office in March, he sent in refills of all my medication. He did not send in refills for my ProAir inhaler. At that time I had refills from my former physician Dr. Valencia, so I didn't realize the ProAir wasn't sent. I attempted to request it from my chart but it didn't give me an option to do so. I had the pharmacy send a request for it.     Thanks Shanae

## 2020-09-22 DIAGNOSIS — G47.9 DYSSOMNIA: ICD-10-CM

## 2020-09-22 DIAGNOSIS — M25.50 POLYARTHRALGIA: ICD-10-CM

## 2020-09-22 RX ORDER — ALBUTEROL SULFATE 90 UG/1
2 AEROSOL, METERED RESPIRATORY (INHALATION) EVERY 6 HOURS PRN
Status: CANCELLED | OUTPATIENT
Start: 2020-09-22

## 2020-09-22 NOTE — TELEPHONE ENCOUNTER
Last fill:3/19/20  Last office visit:4/16/20  Next office visit:NONE  CSA up-to-date? 11/20/19  Date of last UDS: 11/20/19  UDS consistent:

## 2020-09-23 ENCOUNTER — PATIENT MESSAGE (OUTPATIENT)
Dept: FAMILY MEDICINE CLINIC | Facility: CLINIC | Age: 40
End: 2020-09-23

## 2020-09-23 RX ORDER — ATORVASTATIN CALCIUM 20 MG/1
20 TABLET, FILM COATED ORAL DAILY
Qty: 90 TABLET | Refills: 1 | Status: SHIPPED | OUTPATIENT
Start: 2020-09-23 | End: 2021-08-06

## 2020-09-23 RX ORDER — CHOLECALCIFEROL (VITAMIN D3) 125 MCG
5 CAPSULE ORAL NIGHTLY
Qty: 90 TABLET | Refills: 3 | Status: SHIPPED | OUTPATIENT
Start: 2020-09-23 | End: 2022-09-15

## 2020-09-23 RX ORDER — BUPROPION HYDROCHLORIDE 150 MG/1
TABLET, EXTENDED RELEASE ORAL
Qty: 270 TABLET | Refills: 1 | Status: SHIPPED | OUTPATIENT
Start: 2020-09-23 | End: 2021-05-02 | Stop reason: SDUPTHER

## 2020-09-23 RX ORDER — GABAPENTIN 300 MG/1
300 CAPSULE ORAL 2 TIMES DAILY
Qty: 180 CAPSULE | Refills: 1 | Status: SHIPPED | OUTPATIENT
Start: 2020-09-23 | End: 2021-03-26 | Stop reason: SDUPTHER

## 2020-09-23 RX ORDER — ALBUTEROL SULFATE 90 UG/1
2 AEROSOL, METERED RESPIRATORY (INHALATION) EVERY 6 HOURS PRN
Qty: 18 G | Refills: 0 | Status: SHIPPED | OUTPATIENT
Start: 2020-09-23 | End: 2020-09-24 | Stop reason: SDUPTHER

## 2020-09-24 RX ORDER — ALBUTEROL SULFATE 90 UG/1
2 AEROSOL, METERED RESPIRATORY (INHALATION) EVERY 6 HOURS PRN
Qty: 8.5 G | Refills: 0 | Status: SHIPPED | OUTPATIENT
Start: 2020-09-24 | End: 2020-10-20 | Stop reason: SDUPTHER

## 2020-09-24 NOTE — TELEPHONE ENCOUNTER
From: [facility]  To: Junior Lujan,   Sent: 9/23/2020 10:47 PM EDT  Subject: Prescription Question    Hello. A refill was sent in for my Albuterol inhailer today. My previous prescription was for an 8.5 gram inhailer, the refill is for the 18 gram. This made a significant increase in the price. My original cost was $9 for the 8.5g vs $40 for the 18g. Would you correct this for me, please? I did not  the new refill today.     Thanks,  Shanae

## 2020-10-21 RX ORDER — ALBUTEROL SULFATE 90 UG/1
2 AEROSOL, METERED RESPIRATORY (INHALATION) EVERY 6 HOURS PRN
Qty: 8.5 G | Refills: 0 | Status: SHIPPED | OUTPATIENT
Start: 2020-10-21 | End: 2021-01-14 | Stop reason: SDUPTHER

## 2021-01-12 DIAGNOSIS — M25.50 POLYARTHRALGIA: ICD-10-CM

## 2021-01-12 DIAGNOSIS — M25.562 ACUTE PAIN OF LEFT KNEE: ICD-10-CM

## 2021-01-12 RX ORDER — GABAPENTIN 300 MG/1
300 CAPSULE ORAL 2 TIMES DAILY
Qty: 180 CAPSULE | Refills: 1 | Status: CANCELLED | OUTPATIENT
Start: 2021-01-12

## 2021-01-12 RX ORDER — ALBUTEROL SULFATE 90 UG/1
2 AEROSOL, METERED RESPIRATORY (INHALATION) EVERY 6 HOURS PRN
Qty: 8.5 G | Refills: 0 | Status: CANCELLED | OUTPATIENT
Start: 2021-01-12

## 2021-01-12 RX ORDER — MELOXICAM 15 MG/1
15 TABLET ORAL DAILY
Qty: 180 TABLET | Refills: 1 | Status: CANCELLED | OUTPATIENT
Start: 2021-01-12

## 2021-01-12 RX ORDER — DULOXETIN HYDROCHLORIDE 60 MG/1
60 CAPSULE, DELAYED RELEASE ORAL DAILY
Qty: 90 CAPSULE | Refills: 1 | Status: CANCELLED | OUTPATIENT
Start: 2021-01-12

## 2021-01-13 NOTE — TELEPHONE ENCOUNTER
Patient needs appointment for refills, has not been seen since April of 2020. Sent patient a [employer] message letting her know this information. Patient to call or respond to [employer] message to schedule appointment

## 2021-01-14 ENCOUNTER — TELEMEDICINE (OUTPATIENT)
Dept: FAMILY MEDICINE CLINIC | Facility: CLINIC | Age: 41
End: 2021-01-14

## 2021-01-14 DIAGNOSIS — M25.50 POLYARTHRALGIA: ICD-10-CM

## 2021-01-14 DIAGNOSIS — M25.569 CHRONIC KNEE PAIN, UNSPECIFIED LATERALITY: ICD-10-CM

## 2021-01-14 DIAGNOSIS — F32.A DEPRESSION, UNSPECIFIED DEPRESSION TYPE: ICD-10-CM

## 2021-01-14 DIAGNOSIS — Z51.81 ENCOUNTER FOR THERAPEUTIC DRUG MONITORING: ICD-10-CM

## 2021-01-14 DIAGNOSIS — G89.29 CHRONIC KNEE PAIN, UNSPECIFIED LATERALITY: ICD-10-CM

## 2021-01-14 DIAGNOSIS — J45.30 MILD PERSISTENT ASTHMA WITHOUT COMPLICATION: Primary | ICD-10-CM

## 2021-01-14 PROCEDURE — 99214 OFFICE O/P EST MOD 30 MIN: CPT | Performed by: PHYSICIAN ASSISTANT

## 2021-01-14 RX ORDER — MELOXICAM 15 MG/1
15 TABLET ORAL DAILY
Qty: 90 TABLET | Refills: 0 | Status: SHIPPED | OUTPATIENT
Start: 2021-01-14 | End: 2021-05-02 | Stop reason: SDUPTHER

## 2021-01-14 RX ORDER — MONTELUKAST SODIUM 10 MG/1
10 TABLET ORAL NIGHTLY
Qty: 30 TABLET | Refills: 3 | Status: SHIPPED | OUTPATIENT
Start: 2021-01-14 | End: 2021-05-02 | Stop reason: SDUPTHER

## 2021-01-14 RX ORDER — ALBUTEROL SULFATE 90 UG/1
2 AEROSOL, METERED RESPIRATORY (INHALATION) EVERY 6 HOURS PRN
Qty: 8.5 G | Refills: 0 | Status: SHIPPED | OUTPATIENT
Start: 2021-01-14 | End: 2021-02-15

## 2021-01-14 RX ORDER — DULOXETIN HYDROCHLORIDE 60 MG/1
60 CAPSULE, DELAYED RELEASE ORAL DAILY
Qty: 90 CAPSULE | Refills: 1 | Status: SHIPPED | OUTPATIENT
Start: 2021-01-14 | End: 2021-08-06 | Stop reason: SDUPTHER

## 2021-01-14 NOTE — PROGRESS NOTES
"    Chief Complaint   Patient presents with   • Med Refill       HPI     Shanae Alves is a pleasant 40 y.o. female who presents for evaluation of \"chief complaint.\" You have chosen to receive care through a telehealth visit.  Do you consent to use a video/audio connection for your medical care today? Yes.     Requesting medication refills today.   Knee arthritis: meloxicam works well.   Depression: Doing well on cymbalta. Some increased sweating in the past year. Tried estradiol patch which helped mildly but not enough to continue taking it. Regular periods.   Using albuterol 2 puffs for wheezing at night. Continues to smoke occasionally. Seasonal allergies. No GERD.  She is on gabapentin 300 mg bid which helps with joint pain. Tolerates this well. Her pharmacy dispensed a 1 month rather than 3 month supply at the beginning of January.      Past Medical History:   Diagnosis Date   • Arthritis    • Depression    • Headache    • Hyperlipidemia    • Hypothyroidism        Past Surgical History:   Procedure Laterality Date   • CERVICAL CERCLAGE  2012       Family History   Problem Relation Age of Onset   • Arthritis Mother    • Hyperlipidemia Mother    • Thyroid disease Mother    • Arthritis Father        Social History     Socioeconomic History   • Marital status: Single     Spouse name: Not on file   • Number of children: Not on file   • Years of education: Not on file   • Highest education level: Not on file   Tobacco Use   • Smoking status: Current Every Day Smoker     Packs/day: 1.00     Types: Cigarettes   • Smokeless tobacco: Never Used   Substance and Sexual Activity   • Alcohol use: No     Frequency: Never   • Drug use: No       No Known Allergies    ROS    Review of Systems   HENT: Negative for congestion and postnasal drip.    Respiratory: Positive for wheezing. Negative for cough.    Psychiatric/Behavioral: Negative for suicidal ideas and depressed mood.       There were no vitals filed for this " visit.  There is no height or weight on file to calculate BMI.      Current Outpatient Medications:   •  acyclovir (ZOVIRAX) 800 MG tablet, take 1 tablet by mouth daily FOR COLD SORE PREVENTION, Disp: , Rfl: 0  •  albuterol sulfate HFA (ProAir HFA) 108 (90 Base) MCG/ACT inhaler, Inhale 2 puffs Every 6 (Six) Hours As Needed for Wheezing., Disp: 8.5 g, Rfl: 0  •  atorvastatin (LIPITOR) 20 MG tablet, Take 1 tablet by mouth Daily., Disp: 90 tablet, Rfl: 1  •  buPROPion SR (WELLBUTRIN SR) 150 MG 12 hr tablet, Take 2 tablets by mouth Every Morning AND 1 tablet every night at bedtime., Disp: 270 tablet, Rfl: 1  •  diclofenac (VOLTAREN) 50 MG EC tablet, Take 1 tablet by mouth 2 (Two) Times a Day As Needed (knee pain)., Disp: 60 tablet, Rfl: 1  •  DULoxetine (CYMBALTA) 60 MG capsule, Take 1 capsule by mouth Daily., Disp: 90 capsule, Rfl: 1  •  estradiol (CLIMARA) 0.0375 MG/24HR, Place 1 patch on the skin as directed by provider 1 (One) Time Per Week., Disp: 4 each, Rfl: 2  •  gabapentin (NEURONTIN) 300 MG capsule, Take 1 capsule by mouth 2 (Two) Times a Day., Disp: 180 capsule, Rfl: 1  •  levothyroxine (SYNTHROID, LEVOTHROID) 25 MCG tablet, Take 1 tablet by mouth Daily., Disp: 90 tablet, Rfl: 3  •  melatonin 5 MG tablet tablet, Take 1 tablet by mouth Every Night., Disp: 90 tablet, Rfl: 3  •  meloxicam (MOBIC) 15 MG tablet, Take 1 tablet by mouth Daily., Disp: 90 tablet, Rfl: 0  •  metFORMIN (Glucophage) 500 MG tablet, Take 1 tablet by mouth 2 (Two) Times a Day With Meals., Disp: 180 tablet, Rfl: 3  •  montelukast (Singulair) 10 MG tablet, Take 1 tablet by mouth Every Night., Disp: 30 tablet, Rfl: 3  •  SUBOXONE 8-2 MG film film, dissolve 2 FILMS under the tongue once daily, Disp: , Rfl: 0    PE    Physical Exam  Vitals signs reviewed.   Constitutional:       Appearance: She is not toxic-appearing.   Pulmonary:      Effort: Pulmonary effort is normal. No respiratory distress.   Neurological:      Mental Status: She is alert.    Psychiatric:         Mood and Affect: Mood normal.          A/P    Problem List Items Addressed This Visit        Mental Health    Depression  -Mood has been good on current treatment. She does have sweating which may be a side effect of Cymbalta. Will defer medication changes until she is able to follow-up with Dr. Lujan.    Relevant Medications    buPROPion SR (WELLBUTRIN SR) 150 MG 12 hr tablet    DULoxetine (CYMBALTA) 60 MG capsule      Other Visit Diagnoses     Mild persistent asthma without complication    -  Primary  -Uncontrolled  -Add Singulair  -Counseled patient to quit smoking  -Refill albuterol inhaler  -If symptoms persist consider steroid maintenance inhaler  -Return to clinic in 2 months for physical and lab work with Dr. Lujan    Relevant Medications    albuterol sulfate HFA (ProAir HFA) 108 (90 Base) MCG/ACT inhaler    montelukast (Singulair) 10 MG tablet    Chronic knee pain, unspecified laterality      -Controlled with meloxicam  -Continue current treatment    Relevant Medications    meloxicam (MOBIC) 15 MG tablet    Polyarthralgia      -She will need to come into the office to update controlled substance agreement and urine drug screen before gabapentin can be refilled. She states she plans to do this this week. Order UDS  -This patient is on a controlled substance which improves symptoms/quality of life and is aware of the risks, benefits and possible side-effects current treatment. The patient denies any medication side-effects at this time.          Plan of care was reviewed with patient at the conclusion of today's visit. Education was provided regarding diagnoses, management, prescribed or recommended OTC products, and the importance of compliance with follow-up appointments. The patient was counseled regarding the risks, benefits, and possible side-effects of treatment. I advised the patient to keep me informed of any acute changes in their status including new, worsening, or persistent  symptoms. Patient expresses understanding and agreement with the management plan.        SALO Campo

## 2021-01-15 ENCOUNTER — CLINICAL SUPPORT (OUTPATIENT)
Dept: FAMILY MEDICINE CLINIC | Facility: CLINIC | Age: 41
End: 2021-01-15

## 2021-01-15 DIAGNOSIS — Z51.81 ENCOUNTER FOR THERAPEUTIC DRUG MONITORING: Primary | ICD-10-CM

## 2021-02-15 RX ORDER — ALBUTEROL SULFATE 90 UG/1
AEROSOL, METERED RESPIRATORY (INHALATION)
Qty: 8.5 G | Refills: 0 | Status: SHIPPED | OUTPATIENT
Start: 2021-02-15 | End: 2021-04-21

## 2021-03-22 ENCOUNTER — TELEPHONE (OUTPATIENT)
Dept: FAMILY MEDICINE CLINIC | Facility: CLINIC | Age: 41
End: 2021-03-22

## 2021-03-22 DIAGNOSIS — Z00.00 PREVENTATIVE HEALTH CARE: Primary | ICD-10-CM

## 2021-03-26 DIAGNOSIS — M25.50 POLYARTHRALGIA: ICD-10-CM

## 2021-03-26 RX ORDER — GABAPENTIN 300 MG/1
300 CAPSULE ORAL 2 TIMES DAILY
Qty: 180 CAPSULE | Refills: 1 | Status: SHIPPED | OUTPATIENT
Start: 2021-03-26 | End: 2021-09-06 | Stop reason: SDUPTHER

## 2021-03-26 NOTE — TELEPHONE ENCOUNTER
Last seen (Telemed): 01/14/2021   Next scheduled: 04/07/2021  Last fill: 09/23/2020  CSA up to date: NO  Date of last UDS: 01/14/2021  UDS consistent: CONSISTENT

## 2021-03-31 DIAGNOSIS — R60.0 PEDAL EDEMA: ICD-10-CM

## 2021-03-31 DIAGNOSIS — E03.8 SUBCLINICAL HYPOTHYROIDISM: ICD-10-CM

## 2021-04-01 RX ORDER — LEVOTHYROXINE SODIUM 0.03 MG/1
25 TABLET ORAL DAILY
Qty: 90 TABLET | Refills: 3 | Status: SHIPPED | OUTPATIENT
Start: 2021-04-01 | End: 2022-09-19 | Stop reason: SDUPTHER

## 2021-04-01 NOTE — TELEPHONE ENCOUNTER
Attempted to contact patient, no answer.Left voicemail  and informed her that the labs she requested have been ordered and she can have them done at any [facility] lab.

## 2021-04-21 RX ORDER — ALBUTEROL SULFATE 90 UG/1
AEROSOL, METERED RESPIRATORY (INHALATION)
Qty: 8.5 G | Refills: 0 | Status: SHIPPED | OUTPATIENT
Start: 2021-04-21 | End: 2021-05-25

## 2021-05-02 DIAGNOSIS — G89.29 CHRONIC KNEE PAIN, UNSPECIFIED LATERALITY: ICD-10-CM

## 2021-05-02 DIAGNOSIS — M25.569 CHRONIC KNEE PAIN, UNSPECIFIED LATERALITY: ICD-10-CM

## 2021-05-03 RX ORDER — MELOXICAM 15 MG/1
15 TABLET ORAL DAILY
Qty: 90 TABLET | Refills: 0 | Status: SHIPPED | OUTPATIENT
Start: 2021-05-03 | End: 2021-07-18 | Stop reason: SDUPTHER

## 2021-05-03 RX ORDER — MONTELUKAST SODIUM 10 MG/1
10 TABLET ORAL NIGHTLY
Qty: 30 TABLET | Refills: 3 | Status: SHIPPED | OUTPATIENT
Start: 2021-05-03 | End: 2022-02-17

## 2021-05-03 RX ORDER — BUPROPION HYDROCHLORIDE 150 MG/1
TABLET, EXTENDED RELEASE ORAL
Qty: 270 TABLET | Refills: 1 | Status: SHIPPED | OUTPATIENT
Start: 2021-05-03 | End: 2021-07-13

## 2021-05-03 NOTE — TELEPHONE ENCOUNTER
LOV:01/14/2021 (telemed)  NOV:Not scheduled  *Pt no showed at last appt 4/7/21  Last fill:09/23/2020    Attempted to contact patient to schedule upcoming appt, no answer. Left voicemail for patient to call the office back & schedule an appt (office # given).    Hub may relay message and schedule appointment.

## 2021-05-24 ENCOUNTER — TELEPHONE (OUTPATIENT)
Dept: FAMILY MEDICINE CLINIC | Facility: CLINIC | Age: 41
End: 2021-05-24

## 2021-05-25 RX ORDER — ALBUTEROL SULFATE 90 UG/1
AEROSOL, METERED RESPIRATORY (INHALATION)
Qty: 8.5 G | Refills: 0 | Status: SHIPPED | OUTPATIENT
Start: 2021-05-25 | End: 2021-07-19

## 2021-06-26 DIAGNOSIS — R73.03 PREDIABETES: ICD-10-CM

## 2021-07-13 ENCOUNTER — TELEPHONE (OUTPATIENT)
Dept: FAMILY MEDICINE CLINIC | Facility: CLINIC | Age: 41
End: 2021-07-13

## 2021-07-13 ENCOUNTER — OFFICE VISIT (OUTPATIENT)
Dept: FAMILY MEDICINE CLINIC | Facility: CLINIC | Age: 41
End: 2021-07-13

## 2021-07-13 ENCOUNTER — LAB (OUTPATIENT)
Dept: LAB | Facility: HOSPITAL | Age: 41
End: 2021-07-13

## 2021-07-13 VITALS
SYSTOLIC BLOOD PRESSURE: 132 MMHG | BODY MASS INDEX: 35.4 KG/M2 | RESPIRATION RATE: 18 BRPM | OXYGEN SATURATION: 94 % | WEIGHT: 233.6 LBS | HEART RATE: 74 BPM | HEIGHT: 68 IN | DIASTOLIC BLOOD PRESSURE: 70 MMHG

## 2021-07-13 DIAGNOSIS — F32.A DEPRESSION, UNSPECIFIED DEPRESSION TYPE: ICD-10-CM

## 2021-07-13 DIAGNOSIS — R23.2 HOT FLASHES: ICD-10-CM

## 2021-07-13 DIAGNOSIS — Z00.00 PREVENTATIVE HEALTH CARE: Primary | ICD-10-CM

## 2021-07-13 DIAGNOSIS — R73.03 PREDIABETES: ICD-10-CM

## 2021-07-13 DIAGNOSIS — E03.8 SUBCLINICAL HYPOTHYROIDISM: ICD-10-CM

## 2021-07-13 DIAGNOSIS — Z00.00 PREVENTATIVE HEALTH CARE: ICD-10-CM

## 2021-07-13 LAB
BILIRUB UR QL STRIP: NEGATIVE
CLARITY UR: CLEAR
COLOR UR: YELLOW
DEPRECATED RDW RBC AUTO: 44.8 FL (ref 37–54)
ERYTHROCYTE [DISTWIDTH] IN BLOOD BY AUTOMATED COUNT: 13.7 % (ref 12.3–15.4)
EXPIRATION DATE: NORMAL
GLUCOSE UR STRIP-MCNC: NEGATIVE MG/DL
HBA1C MFR BLD: 5.5 % (ref 4.8–5.6)
HBA1C MFR BLD: 5.6 %
HCT VFR BLD AUTO: 46.9 % (ref 34–46.6)
HGB BLD-MCNC: 15.2 G/DL (ref 12–15.9)
HGB UR QL STRIP.AUTO: NEGATIVE
KETONES UR QL STRIP: NEGATIVE
LEUKOCYTE ESTERASE UR QL STRIP.AUTO: NEGATIVE
Lab: NORMAL
MCH RBC QN AUTO: 29.1 PG (ref 26.6–33)
MCHC RBC AUTO-ENTMCNC: 32.4 G/DL (ref 31.5–35.7)
MCV RBC AUTO: 89.7 FL (ref 79–97)
NITRITE UR QL STRIP: NEGATIVE
PH UR STRIP.AUTO: 6 [PH] (ref 5–8)
PLATELET # BLD AUTO: 350 10*3/MM3 (ref 140–450)
PMV BLD AUTO: 11.5 FL (ref 6–12)
PROT UR QL STRIP: NEGATIVE
RBC # BLD AUTO: 5.23 10*6/MM3 (ref 3.77–5.28)
SP GR UR STRIP: 1.02 (ref 1–1.03)
UROBILINOGEN UR QL STRIP: NORMAL
WBC # BLD AUTO: 10.91 10*3/MM3 (ref 3.4–10.8)

## 2021-07-13 PROCEDURE — 99396 PREV VISIT EST AGE 40-64: CPT | Performed by: FAMILY MEDICINE

## 2021-07-13 PROCEDURE — 85027 COMPLETE CBC AUTOMATED: CPT

## 2021-07-13 PROCEDURE — 80061 LIPID PANEL: CPT

## 2021-07-13 PROCEDURE — 83036 HEMOGLOBIN GLYCOSYLATED A1C: CPT

## 2021-07-13 PROCEDURE — 83036 HEMOGLOBIN GLYCOSYLATED A1C: CPT | Performed by: FAMILY MEDICINE

## 2021-07-13 PROCEDURE — 81003 URINALYSIS AUTO W/O SCOPE: CPT

## 2021-07-13 PROCEDURE — 84443 ASSAY THYROID STIM HORMONE: CPT

## 2021-07-13 PROCEDURE — 80053 COMPREHEN METABOLIC PANEL: CPT

## 2021-07-13 RX ORDER — BUPROPION HYDROCHLORIDE 150 MG/1
150 TABLET, EXTENDED RELEASE ORAL NIGHTLY
Qty: 270 TABLET | Refills: 1 | Status: SHIPPED | OUTPATIENT
Start: 2021-07-13 | End: 2022-02-25 | Stop reason: SDUPTHER

## 2021-07-13 NOTE — TELEPHONE ENCOUNTER
Dosing clarification, patient had been doing 2 in am and 1 at night. Wanted to verify we still wanted this med taken the same way. The quantity would warrant it.    Please verify

## 2021-07-13 NOTE — TELEPHONE ENCOUNTER
Called and left vm for patient letting her know pharmacy is on lunch will call back to change quantity.

## 2021-07-13 NOTE — PROGRESS NOTES
Subjective   Shanae Alves is a 40 y.o. female.     Chief Complaint   Patient presents with   • Annual Exam       History of Present Illness     Shanae Alves presents today for annual physical exam and preventative care. Shanae presents today for annual well adult physical exam. She has a recent diagnosis of prediabetes as of 11/2019. Her last visit here in the office was 04/2020. At that time, she was on metformin 500 mg twice daily with meals, and her A1c had improved from 6.11 to 5.8 percent. She has subclinical hypothyroidism, and has felt improved symptomatically on levothyroxine 25 mcg daily and her TSH has normalized. She has a history of polyarthralgia on gabapentin 300 mg twice daily; hyperlipidemia on atorvastatin 20 mg daily; and chronic depression managed with bupropion SR, and duloxetine. She had labs ordered in 04/2020 prior to her physical, which have not been completed yet.     She is still taking metformin and reports it is doing okay. She denies any changes to her health since last year other than the acute issues. She denies any surgeries or hospitalizations.     Regarding her hot flashes, she reports that she stopped using the patches because she could not keep them on due to perspiring. She states that when the patches did stay, they were helpful. She adds that the hot flashes are still severe, and most severe in the morning, and sometimes in the evening. She has not seen a gynecologist in the last couple of years.     She reports that she is still taking the gabapentin 300 mg twice a day, and has been on this for 2 years.    She states she is still on Wellbutrin, but does not take the morning dose, and has been on Wellbutrin for 2 years. She reports the hot flashes started before she began the Wellbutrin. She states that the hot flashes might have gotten worse with Wellbutrin.     She is also taking Cymbalta and duloxetine, and started taking them 2 years ago. She endorses that  the hot flashes predated any of the medications she is on.     She reports that she does self-breast exams, and has not found any lumps. She denies any abdominal pain, constipation, or diarrhea. She reports she did the keto diet for approximately 2 months, and lost 25 pounds. She has not gained any weight since she went off of the keto diet. She states that she drinks flavored water.     She reports swelling in her feet when she sits, and works all day. She states that It is better if she wears her tennis shoes. If she wears flip-flops, her feet swell severely.     She states that she is still on suboxone.        This patient is accompanied by their self who contributes to the history of their care.    The following portions of the patient's history were reviewed and updated as appropriate: allergies, current medications, past family history, past medical history, past social history, past surgical history and problem list.    Active Ambulatory Problems     Diagnosis Date Noted   • Family history of hypothyroidism 11/13/2019   • Personal history of tobacco use, presenting hazards to health 11/13/2019   • Subclinical hypothyroidism 11/20/2019   • Prediabetes 11/20/2019   • Depression 01/14/2021     Resolved Ambulatory Problems     Diagnosis Date Noted   • No Resolved Ambulatory Problems     Past Medical History:   Diagnosis Date   • Arthritis    • Headache    • Hyperlipidemia    • Hypothyroidism      Past Surgical History:   Procedure Laterality Date   • CERVICAL CERCLAGE  2012     Family History   Problem Relation Age of Onset   • Arthritis Mother    • Hyperlipidemia Mother    • Thyroid disease Mother    • Arthritis Father      Social History     Socioeconomic History   • Marital status: Single     Spouse name: Not on file   • Number of children: Not on file   • Years of education: Not on file   • Highest education level: Not on file   Tobacco Use   • Smoking status: Current Every Day Smoker     Packs/day: 1.00      "Types: Cigarettes   • Smokeless tobacco: Never Used   Substance and Sexual Activity   • Alcohol use: No   • Drug use: No       Review of Systems  See Physical ROS scanned into chart    Objective   Blood pressure 132/70, pulse 74, resp. rate 18, height 172.7 cm (67.99\"), weight 106 kg (233 lb 9.6 oz), SpO2 94 %.  Nursing note reviewed  Physical Exam  General: Patient is well-nourished, well-developed, and in no acute distress.  HEENT: Normocephalic with no contusions noted, no ptosis or palsy. Pupils equally round and reactive to light, extraocular movements intact. Patient holds steady gaze, can follow to midline. Hearing grossly normal without deficit, exterior auricles normal, tympanic membranes normal without erythema or bulging.  Lymphatic: Posterior auricular, cervical, submandibular, supraclavicular, axillary lymphatic sites palpated without abnormality  Cardiovascular: Normal study rate without ectopy. PMI palpated, normal. Normal S1, S2. No murmurs rubs or gallops.  Respiratory: No tenderness to palpation on the chest wall, lungs clear to auscultation bilaterally, no wheezes, rales, or rhonchi. No pleural friction rubs.  Gastrointestinal: Bowel sounds present, normoactive globally. No bruit noted. Nontender, nondistended. Normal percussive exam, no hepatomegaly, no splenomegaly. No hernias, scars, gross lesions.  Extremities: No cyanosis or edema, 2+ pulses bilaterally, reflexes normal. Capillary refill time normal.  MSK: Normal gait and station. 5/5 strength globally.  Neuro: Cranial nerves II-XII grossly intact. Patient alert and oriented x3.  PHQ-9 Depression Screening  Little interest or pleasure in doing things? 0   Feeling down, depressed, or hopeless? 0   Trouble falling or staying asleep, or sleeping too much?     Feeling tired or having little energy?     Poor appetite or overeating?     Feeling bad about yourself - or that you are a failure or have let yourself or your family down?     Trouble " concentrating on things, such as reading the newspaper or watching television?     Moving or speaking so slowly that other people could have noticed? Or the opposite - being so fidgety or restless that you have been moving around a lot more than usual?     Thoughts that you would be better off dead, or of hurting yourself in some way?     PHQ-9 Total Score 0   If you checked off any problems, how difficult have these problems made it for you to do your work, take care of things at home, or get along with other people?       Procedures  Assessment/Plan   Problem List Items Addressed This Visit        Endocrine and Metabolic    Subclinical hypothyroidism    Prediabetes    Overview     A1c 6.11% November 2019         Relevant Orders    POC Glycosylated Hemoglobin (Hb A1C) (Completed)       Mental Health    Depression    Relevant Medications    buPROPion SR (WELLBUTRIN SR) 150 MG 12 hr tablet      Other Visit Diagnoses     Preventative health care    -  Primary    Hot flashes        Relevant Orders    Ambulatory Referral to Gynecology        1. Preventative health care    2. Hot flashes  - This is possibly related to Wellbutrin.  - She was instructed to only take 1 Wellbutrin at night, and see if this makes a difference in her hot flashes.  - She was given a referral for gynecology.    3. Chronic depression  - Doing well on current medication regimen.  - Continue bupropion SR, and duloxetine.    4. Prediabetes  - Continue metformin 500 mg twice daily.  - On last visit, her A1c improved from 6.11 to 5.8.    5. Subclinical hypothyroidism  - Continue levothyroxine 25 mcg daily.  - She feels symptomatically improved on this dosage, and her TSH has normalized.    6. Health maintenance  - Obtain fasting labs today.      See patient diagnoses and orders along with patient instructions for assessment, plan, and changes to care for patient.    The preventative exam has been reviewed in detail.  The patient has been fully  counseled on preventative guidelines for vaccines, cancer screenings, and other health maintenance needs. The patient was counseled on maintaining a lifestyle to promote good health and to minimize chronic diseases.  The patient has been assisted with scheduling healthcare procedures for the coming year and given a written document outlining these recommendations. Age-appropriate screening measures have been ordered for the patient today as indicated above.    Patient Instructions   Preventive Care 40-64 Years Old, Female  Preventive care refers to visits with your health care provider and lifestyle choices that can promote health and wellness. This includes:  · A yearly physical exam. This may also be called an annual well check.  · Regular dental visits and eye exams.  · Immunizations.  · Screening for certain conditions.  · Healthy lifestyle choices, such as eating a healthy diet, getting regular exercise, not using drugs or products that contain nicotine and tobacco, and limiting alcohol use.  What can I expect for my preventive care visit?  Physical exam  Your health care provider will check your:  · Height and weight. This may be used to calculate body mass index (BMI), which tells if you are at a healthy weight.  · Heart rate and blood pressure.  · Skin for abnormal spots.  Counseling  Your health care provider may ask you questions about your:  · Alcohol, tobacco, and drug use.  · Emotional well-being.  · Home and relationship well-being.  · Sexual activity.  · Eating habits.  · Work and work environment.  · Method of birth control.  · Menstrual cycle.  · Pregnancy history.  What immunizations do I need?    Influenza (flu) vaccine  · This is recommended every year.  Tetanus, diphtheria, and pertussis (Tdap) vaccine  · You may need a Td booster every 10 years.  Varicella (chickenpox) vaccine  · You may need this if you have not been vaccinated.  Zoster (shingles) vaccine  · You may need this after age  60.  Measles, mumps, and rubella (MMR) vaccine  · You may need at least one dose of MMR if you were born in 1957 or later. You may also need a second dose.  Pneumococcal conjugate (PCV13) vaccine  · You may need this if you have certain conditions and were not previously vaccinated.  Pneumococcal polysaccharide (PPSV23) vaccine  · You may need one or two doses if you smoke cigarettes or if you have certain conditions.  Meningococcal conjugate (MenACWY) vaccine  · You may need this if you have certain conditions.  Hepatitis A vaccine  · You may need this if you have certain conditions or if you travel or work in places where you may be exposed to hepatitis A.  Hepatitis B vaccine  · You may need this if you have certain conditions or if you travel or work in places where you may be exposed to hepatitis B.  Haemophilus influenzae type b (Hib) vaccine  · You may need this if you have certain conditions.  Human papillomavirus (HPV) vaccine  · If recommended by your health care provider, you may need three doses over 6 months.  You may receive vaccines as individual doses or as more than one vaccine together in one shot (combination vaccines). Talk with your health care provider about the risks and benefits of combination vaccines.  What tests do I need?  Blood tests  · Lipid and cholesterol levels. These may be checked every 5 years, or more frequently if you are over 50 years old.  · Hepatitis C test.  · Hepatitis B test.  Screening  · Lung cancer screening. You may have this screening every year starting at age 55 if you have a 30-pack-year history of smoking and currently smoke or have quit within the past 15 years.  · Colorectal cancer screening. All adults should have this screening starting at age 50 and continuing until age 75. Your health care provider may recommend screening at age 45 if you are at increased risk. You will have tests every 1-10 years, depending on your results and the type of screening  test.  · Diabetes screening. This is done by checking your blood sugar (glucose) after you have not eaten for a while (fasting). You may have this done every 1-3 years.  · Mammogram. This may be done every 1-2 years. Talk with your health care provider about when you should start having regular mammograms. This may depend on whether you have a family history of breast cancer.  · BRCA-related cancer screening. This may be done if you have a family history of breast, ovarian, tubal, or peritoneal cancers.  · Pelvic exam and Pap test. This may be done every 3 years starting at age 21. Starting at age 30, this may be done every 5 years if you have a Pap test in combination with an HPV test.  Other tests  · Sexually transmitted disease (STD) testing.  · Bone density scan. This is done to screen for osteoporosis. You may have this scan if you are at high risk for osteoporosis.  Follow these instructions at home:  Eating and drinking  · Eat a diet that includes fresh fruits and vegetables, whole grains, lean protein, and low-fat dairy.  · Take vitamin and mineral supplements as recommended by your health care provider.  · Do not drink alcohol if:  ? Your health care provider tells you not to drink.  ? You are pregnant, may be pregnant, or are planning to become pregnant.  · If you drink alcohol:  ? Limit how much you have to 0-1 drink a day.  ? Be aware of how much alcohol is in your drink. In the [place] one drink equals one 12 oz bottle of beer (355 mL), one 5 oz glass of wine (148 mL), or one 1½ oz glass of hard liquor (44 mL).  Lifestyle  · Take daily care of your teeth and gums.  · Stay active. Exercise for at least 30 minutes on 5 or more days each week.  · Do not use any products that contain nicotine or tobacco, such as cigarettes, e-cigarettes, and chewing tobacco. If you need help quitting, ask your health care provider.  · If you are sexually active, practice safe sex. Use a condom or other form of birth control  (contraception) in order to prevent pregnancy and STIs (sexually transmitted infections).  · If told by your health care provider, take low-dose aspirin daily starting at age 50.  What's next?  · Visit your health care provider once a year for a well check visit.  · Ask your health care provider how often you should have your eyes and teeth checked.  · Stay up to date on all vaccines.  This information is not intended to replace advice given to you by your health care provider. Make sure you discuss any questions you have with your health care provider.  Document Revised: 08/29/2019 Document Reviewed: 08/29/2019  [employer] Patient Education © 2020 [employer]        Return in about 3 months (around 10/13/2021) for Controlled substance 3-month.    Ambulatory progress note signed and attested to by Junior Lujan D.O.           Current Outpatient Medications:   •  acyclovir (ZOVIRAX) 800 MG tablet, take 1 tablet by mouth daily FOR COLD SORE PREVENTION, Disp: , Rfl: 0  •  albuterol sulfate  (90 Base) MCG/ACT inhaler, INHALE 2 PUFFS BY MOUTH EVERY 6 HOURS AS NEEDED FOR WHEEZING, Disp: 8.5 g, Rfl: 0  •  atorvastatin (LIPITOR) 20 MG tablet, Take 1 tablet by mouth Daily., Disp: 90 tablet, Rfl: 1  •  buPROPion SR (WELLBUTRIN SR) 150 MG 12 hr tablet, Take 1 tablet by mouth Every Night., Disp: 270 tablet, Rfl: 1  •  diclofenac (VOLTAREN) 50 MG EC tablet, Take 1 tablet by mouth 2 (Two) Times a Day As Needed (knee pain)., Disp: 60 tablet, Rfl: 1  •  DULoxetine (CYMBALTA) 60 MG capsule, Take 1 capsule by mouth Daily., Disp: 90 capsule, Rfl: 1  •  gabapentin (NEURONTIN) 300 MG capsule, Take 1 capsule by mouth 2 (Two) Times a Day., Disp: 180 capsule, Rfl: 1  •  levothyroxine (SYNTHROID, LEVOTHROID) 25 MCG tablet, TAKE 1 TABLET BY MOUTH DAILY, Disp: 90 tablet, Rfl: 3  •  melatonin 5 MG tablet tablet, Take 1 tablet by mouth Every Night., Disp: 90 tablet, Rfl: 3  •  meloxicam (MOBIC) 15 MG tablet, Take 1 tablet by mouth  Daily., Disp: 90 tablet, Rfl: 0  •  metFORMIN (GLUCOPHAGE) 500 MG tablet, TAKE 1 TABLET BY MOUTH TWICE DAILY WITH MEALS, Disp: 180 tablet, Rfl: 3  •  montelukast (Singulair) 10 MG tablet, Take 1 tablet by mouth Every Night., Disp: 30 tablet, Rfl: 3  •  SUBOXONE 8-2 MG film film, dissolve 2 FILMS under the tongue once daily, Disp: , Rfl: 0         Scribed for Junior Lujan DO by Tamela Trevizo.  07/13/21   14:04 EDT    I have personally performed the services described in this document as scribed by the above individual, and it is both accurate and complete.  Junior Lujan,   7/28/2021  08:53 EDT

## 2021-07-14 LAB
ALBUMIN SERPL-MCNC: 4.6 G/DL (ref 3.5–5.2)
ALBUMIN/GLOB SERPL: 1.5 G/DL
ALP SERPL-CCNC: 84 U/L (ref 39–117)
ALT SERPL W P-5'-P-CCNC: 16 U/L (ref 1–33)
ANION GAP SERPL CALCULATED.3IONS-SCNC: 9.2 MMOL/L (ref 5–15)
AST SERPL-CCNC: 15 U/L (ref 1–32)
BILIRUB SERPL-MCNC: 0.2 MG/DL (ref 0–1.2)
BUN SERPL-MCNC: 18 MG/DL (ref 6–20)
BUN/CREAT SERPL: 20.2 (ref 7–25)
CALCIUM SPEC-SCNC: 9.9 MG/DL (ref 8.6–10.5)
CHLORIDE SERPL-SCNC: 100 MMOL/L (ref 98–107)
CHOLEST SERPL-MCNC: 222 MG/DL (ref 0–200)
CO2 SERPL-SCNC: 27.8 MMOL/L (ref 22–29)
CREAT SERPL-MCNC: 0.89 MG/DL (ref 0.57–1)
GFR SERPL CREATININE-BSD FRML MDRD: 70 ML/MIN/1.73
GLOBULIN UR ELPH-MCNC: 3 GM/DL
GLUCOSE SERPL-MCNC: 98 MG/DL (ref 65–99)
HDLC SERPL-MCNC: 43 MG/DL (ref 40–60)
LDLC SERPL CALC-MCNC: 161 MG/DL (ref 0–100)
LDLC/HDLC SERPL: 3.71 {RATIO}
POTASSIUM SERPL-SCNC: 5 MMOL/L (ref 3.5–5.2)
PROT SERPL-MCNC: 7.6 G/DL (ref 6–8.5)
SODIUM SERPL-SCNC: 137 MMOL/L (ref 136–145)
TRIGL SERPL-MCNC: 98 MG/DL (ref 0–150)
TSH SERPL DL<=0.05 MIU/L-ACNC: 3.84 UIU/ML (ref 0.27–4.2)
VLDLC SERPL-MCNC: 18 MG/DL (ref 5–40)

## 2021-07-18 DIAGNOSIS — G89.29 CHRONIC KNEE PAIN, UNSPECIFIED LATERALITY: ICD-10-CM

## 2021-07-18 DIAGNOSIS — M25.569 CHRONIC KNEE PAIN, UNSPECIFIED LATERALITY: ICD-10-CM

## 2021-07-19 RX ORDER — ALBUTEROL SULFATE 90 UG/1
2 AEROSOL, METERED RESPIRATORY (INHALATION) EVERY 6 HOURS PRN
Qty: 8.5 G | Refills: 0 | OUTPATIENT
Start: 2021-07-19

## 2021-07-19 RX ORDER — MELOXICAM 15 MG/1
15 TABLET ORAL DAILY
Qty: 90 TABLET | Refills: 0 | Status: SHIPPED | OUTPATIENT
Start: 2021-07-19 | End: 2021-10-19 | Stop reason: SDUPTHER

## 2021-07-19 RX ORDER — ALBUTEROL SULFATE 90 UG/1
AEROSOL, METERED RESPIRATORY (INHALATION)
Qty: 8.5 G | Refills: 0 | Status: SHIPPED | OUTPATIENT
Start: 2021-07-19 | End: 2021-08-06 | Stop reason: SDUPTHER

## 2021-08-06 RX ORDER — ATORVASTATIN CALCIUM 20 MG/1
20 TABLET, FILM COATED ORAL DAILY
Qty: 90 TABLET | Refills: 1 | Status: SHIPPED | OUTPATIENT
Start: 2021-08-06 | End: 2022-01-03 | Stop reason: SDUPTHER

## 2021-08-06 RX ORDER — DULOXETIN HYDROCHLORIDE 60 MG/1
60 CAPSULE, DELAYED RELEASE ORAL DAILY
Qty: 90 CAPSULE | Refills: 1 | Status: SHIPPED | OUTPATIENT
Start: 2021-08-06 | End: 2021-12-23 | Stop reason: SDUPTHER

## 2021-08-09 RX ORDER — ALBUTEROL SULFATE 90 UG/1
AEROSOL, METERED RESPIRATORY (INHALATION)
Qty: 8.5 G | Refills: 0 | OUTPATIENT
Start: 2021-08-09

## 2021-08-09 RX ORDER — ALBUTEROL SULFATE 90 UG/1
2 AEROSOL, METERED RESPIRATORY (INHALATION) EVERY 6 HOURS PRN
Qty: 8.5 G | Refills: 0 | Status: SHIPPED | OUTPATIENT
Start: 2021-08-09 | End: 2021-09-13 | Stop reason: SDUPTHER

## 2021-08-09 NOTE — TELEPHONE ENCOUNTER
Rx Refill Note  Requested Prescriptions     Pending Prescriptions Disp Refills   • albuterol sulfate  (90 Base) MCG/ACT inhaler 8.5 g 0     Sig: Inhale 2 puffs Every 6 (Six) Hours As Needed for Wheezing.      Last office visit with prescribing clinician: 7/13/2021      Next office visit with prescribing clinician: 10/14/2021            Rosy Escobar MA  08/09/21, 13:34 EDT

## 2021-09-06 DIAGNOSIS — M25.50 POLYARTHRALGIA: ICD-10-CM

## 2021-09-07 RX ORDER — GABAPENTIN 300 MG/1
300 CAPSULE ORAL 2 TIMES DAILY
Qty: 180 CAPSULE | Refills: 1 | Status: SHIPPED | OUTPATIENT
Start: 2021-09-07 | End: 2021-12-23 | Stop reason: SDUPTHER

## 2021-09-07 NOTE — TELEPHONE ENCOUNTER
Rx Refill Note  Requested Prescriptions     Pending Prescriptions Disp Refills   • gabapentin (NEURONTIN) 300 MG capsule 180 capsule 1     Sig: Take 1 capsule by mouth 2 (Two) Times a Day.      Last office visit with prescribing clinician: 7/13/2021      Next office visit with prescribing clinician: 10/14/2021   3}  Camryn Martinez MA  09/07/21, 11:45 EDT     Last fill:03/26/2021  CSA:Needs to be updated  UDS:01/14/2021-nothing on UDS

## 2021-09-14 RX ORDER — ALBUTEROL SULFATE 90 UG/1
2 AEROSOL, METERED RESPIRATORY (INHALATION) EVERY 6 HOURS PRN
Qty: 8.5 G | Refills: 0 | Status: SHIPPED | OUTPATIENT
Start: 2021-09-14 | End: 2021-10-19 | Stop reason: SDUPTHER

## 2021-09-14 NOTE — TELEPHONE ENCOUNTER
Rx Refill Note  Requested Prescriptions     Pending Prescriptions Disp Refills   • albuterol sulfate  (90 Base) MCG/ACT inhaler 8.5 g 0     Sig: Inhale 2 puffs Every 6 (Six) Hours As Needed for Wheezing.      Last office visit with prescribing clinician: 7/13/2021      Next office visit with prescribing clinician: 10/14/2021            Rachel Rebolledo MA  09/14/21, 08:33 EDT

## 2021-10-19 DIAGNOSIS — G89.29 CHRONIC KNEE PAIN, UNSPECIFIED LATERALITY: ICD-10-CM

## 2021-10-19 DIAGNOSIS — M25.569 CHRONIC KNEE PAIN, UNSPECIFIED LATERALITY: ICD-10-CM

## 2021-10-19 RX ORDER — MELOXICAM 15 MG/1
15 TABLET ORAL DAILY
Qty: 90 TABLET | Refills: 0 | Status: SHIPPED | OUTPATIENT
Start: 2021-10-19 | End: 2021-12-23 | Stop reason: SDUPTHER

## 2021-10-19 RX ORDER — ALBUTEROL SULFATE 90 UG/1
2 AEROSOL, METERED RESPIRATORY (INHALATION) EVERY 6 HOURS PRN
Qty: 8.5 G | Refills: 0 | Status: SHIPPED | OUTPATIENT
Start: 2021-10-19 | End: 2021-11-10 | Stop reason: SDUPTHER

## 2021-10-19 NOTE — TELEPHONE ENCOUNTER
Rx Refill Note  Requested Prescriptions     Pending Prescriptions Disp Refills   • meloxicam (MOBIC) 15 MG tablet 90 tablet 0     Sig: Take 1 tablet by mouth Daily.   • albuterol sulfate  (90 Base) MCG/ACT inhaler 8.5 g 0     Sig: Inhale 2 puffs Every 6 (Six) Hours As Needed for Wheezing.      Last office visit with prescribing clinician: 7/13/2021      Next office visit with prescribing clinician: 10/27/2021            KERMIT GOMEZ MA  10/19/21, 09:07 EDT

## 2021-10-27 ENCOUNTER — OFFICE VISIT (OUTPATIENT)
Dept: FAMILY MEDICINE CLINIC | Facility: CLINIC | Age: 41
End: 2021-10-27

## 2021-10-27 VITALS
RESPIRATION RATE: 16 BRPM | HEART RATE: 80 BPM | HEIGHT: 68 IN | BODY MASS INDEX: 36.68 KG/M2 | OXYGEN SATURATION: 96 % | WEIGHT: 242 LBS | DIASTOLIC BLOOD PRESSURE: 80 MMHG | SYSTOLIC BLOOD PRESSURE: 124 MMHG | TEMPERATURE: 97.1 F

## 2021-10-27 DIAGNOSIS — G43.829 MENSTRUAL MIGRAINE WITHOUT STATUS MIGRAINOSUS, NOT INTRACTABLE: ICD-10-CM

## 2021-10-27 DIAGNOSIS — Z51.81 THERAPEUTIC DRUG MONITORING: ICD-10-CM

## 2021-10-27 DIAGNOSIS — M25.50 POLYARTHRALGIA: Primary | ICD-10-CM

## 2021-10-27 PROCEDURE — 99214 OFFICE O/P EST MOD 30 MIN: CPT | Performed by: FAMILY MEDICINE

## 2021-10-27 RX ORDER — RIZATRIPTAN BENZOATE 5 MG/1
5 TABLET ORAL DAILY PRN
Qty: 9 TABLET | Refills: 2 | Status: SHIPPED | OUTPATIENT
Start: 2021-10-27 | End: 2022-02-08 | Stop reason: SDUPTHER

## 2021-11-02 LAB — DRUGS UR: NORMAL

## 2021-11-10 RX ORDER — ALBUTEROL SULFATE 90 UG/1
2 AEROSOL, METERED RESPIRATORY (INHALATION) EVERY 6 HOURS PRN
Qty: 8.5 G | Refills: 0 | Status: SHIPPED | OUTPATIENT
Start: 2021-11-10 | End: 2021-12-08 | Stop reason: SDUPTHER

## 2021-11-10 NOTE — TELEPHONE ENCOUNTER
Rx Refill Note  Requested Prescriptions     Pending Prescriptions Disp Refills   • albuterol sulfate  (90 Base) MCG/ACT inhaler 8.5 g 0     Sig: Inhale 2 puffs Every 6 (Six) Hours As Needed for Wheezing.      Last office visit with prescribing clinician: 10/27/2021      Next office visit with prescribing clinician: 1/28/2022     Camryn Martinez MA  11/10/21, 13:39 EST     Last fill: 10/19/2021

## 2021-12-09 RX ORDER — ALBUTEROL SULFATE 90 UG/1
2 AEROSOL, METERED RESPIRATORY (INHALATION) EVERY 6 HOURS PRN
Qty: 8.5 G | Refills: 0 | Status: SHIPPED | OUTPATIENT
Start: 2021-12-09 | End: 2022-01-03 | Stop reason: SDUPTHER

## 2021-12-09 NOTE — TELEPHONE ENCOUNTER
Rx Refill Note  Requested Prescriptions     Pending Prescriptions Disp Refills   • albuterol sulfate  (90 Base) MCG/ACT inhaler 8.5 g 0     Sig: Inhale 2 puffs Every 6 (Six) Hours As Needed for Wheezing.      Last office visit with prescribing clinician: 10/27/2021      Next office visit with prescribing clinician: 1/28/2022            Rachel Rebolledo MA  12/09/21, 14:38 EST

## 2021-12-23 DIAGNOSIS — M25.50 POLYARTHRALGIA: ICD-10-CM

## 2021-12-23 DIAGNOSIS — M25.569 CHRONIC KNEE PAIN, UNSPECIFIED LATERALITY: ICD-10-CM

## 2021-12-23 DIAGNOSIS — R73.03 PREDIABETES: ICD-10-CM

## 2021-12-23 DIAGNOSIS — G89.29 CHRONIC KNEE PAIN, UNSPECIFIED LATERALITY: ICD-10-CM

## 2021-12-23 RX ORDER — DULOXETIN HYDROCHLORIDE 60 MG/1
60 CAPSULE, DELAYED RELEASE ORAL DAILY
Qty: 90 CAPSULE | Refills: 1 | Status: SHIPPED | OUTPATIENT
Start: 2021-12-23 | End: 2022-06-28 | Stop reason: SDUPTHER

## 2021-12-23 RX ORDER — GABAPENTIN 300 MG/1
300 CAPSULE ORAL 2 TIMES DAILY
Qty: 180 CAPSULE | Refills: 1 | Status: SHIPPED | OUTPATIENT
Start: 2021-12-23 | End: 2022-02-17 | Stop reason: SDUPTHER

## 2021-12-23 RX ORDER — MELOXICAM 15 MG/1
15 TABLET ORAL DAILY
Qty: 90 TABLET | Refills: 0 | Status: SHIPPED | OUTPATIENT
Start: 2021-12-23 | End: 2022-02-25 | Stop reason: SDUPTHER

## 2021-12-23 NOTE — TELEPHONE ENCOUNTER
Rx Refill Note  Requested Prescriptions     Pending Prescriptions Disp Refills   • DULoxetine (CYMBALTA) 60 MG capsule 90 capsule 1     Sig: Take 1 capsule by mouth Daily.      Last office visit with prescribing clinician: 10/27/2021    Next office visit with prescribing clinician: 01/28/2022    Camryn Martinez MA  12/23/21, 08:37 EST     Last fill: 08/06/2021

## 2021-12-23 NOTE — TELEPHONE ENCOUNTER
Rx Refill Note  Requested Prescriptions     Pending Prescriptions Disp Refills   • gabapentin (NEURONTIN) 300 MG capsule 180 capsule 1     Sig: Take 1 capsule by mouth 2 (Two) Times a Day.     Signed Prescriptions Disp Refills   • metFORMIN (GLUCOPHAGE) 500 MG tablet 180 tablet 1     Sig: Take 1 tablet by mouth 2 (Two) Times a Day With Meals.     Authorizing Provider: BEATRIZ BARAHONA     Ordering User: CAMRYN QIU   • meloxicam (MOBIC) 15 MG tablet 90 tablet 0     Sig: Take 1 tablet by mouth Daily.     Authorizing Provider: BEATRIZ BARAHONA     Ordering User: CAMRYN QIU      Last office visit with prescribing clinician: 10/27/2021      Next office visit with prescribing clinician: 1/28/2022     Camryn Qiu MA  12/23/21, 09:09 EST     Last fill: 09/07/2021-Gabapentin   Last fill: 10/19/2021-Meloxicam  Last fill: 06/28/2021-Metformin

## 2022-01-03 RX ORDER — ALBUTEROL SULFATE 90 UG/1
2 AEROSOL, METERED RESPIRATORY (INHALATION) EVERY 6 HOURS PRN
Qty: 8.5 G | Refills: 0 | Status: SHIPPED | OUTPATIENT
Start: 2022-01-03 | End: 2022-02-01

## 2022-01-03 RX ORDER — ATORVASTATIN CALCIUM 20 MG/1
20 TABLET, FILM COATED ORAL DAILY
Qty: 90 TABLET | Refills: 1 | Status: SHIPPED | OUTPATIENT
Start: 2022-01-03 | End: 2022-02-25 | Stop reason: SDUPTHER

## 2022-01-03 NOTE — TELEPHONE ENCOUNTER
Rx Refill Note  Requested Prescriptions     Pending Prescriptions Disp Refills   • atorvastatin (LIPITOR) 20 MG tablet 90 tablet 1     Sig: Take 1 tablet by mouth Daily.   • albuterol sulfate  (90 Base) MCG/ACT inhaler 8.5 g 0     Sig: Inhale 2 puffs Every 6 (Six) Hours As Needed for Wheezing.      Last office visit with prescribing clinician: 10/27/2021      Next office visit with prescribing clinician: 1/28/2022            Raphael Ji MA  01/03/22, 14:33 EST

## 2022-01-24 ENCOUNTER — LAB (OUTPATIENT)
Dept: LAB | Facility: HOSPITAL | Age: 42
End: 2022-01-24

## 2022-01-24 ENCOUNTER — TRANSCRIBE ORDERS (OUTPATIENT)
Dept: LAB | Facility: HOSPITAL | Age: 42
End: 2022-01-24

## 2022-01-24 DIAGNOSIS — F11.20 OPIOID TYPE DEPENDENCE, CONTINUOUS: Primary | ICD-10-CM

## 2022-01-24 DIAGNOSIS — F11.20 OPIOID TYPE DEPENDENCE, CONTINUOUS: ICD-10-CM

## 2022-01-24 LAB
ALBUMIN SERPL-MCNC: 4.3 G/DL (ref 3.5–5.2)
ALBUMIN/GLOB SERPL: 1.7 G/DL
ALP SERPL-CCNC: 84 U/L (ref 39–117)
ALT SERPL W P-5'-P-CCNC: 21 U/L (ref 1–33)
ANION GAP SERPL CALCULATED.3IONS-SCNC: 8.2 MMOL/L (ref 5–15)
AST SERPL-CCNC: 22 U/L (ref 1–32)
BASOPHILS # BLD AUTO: 0.09 10*3/MM3 (ref 0–0.2)
BASOPHILS NFR BLD AUTO: 1 % (ref 0–1.5)
BILIRUB CONJ SERPL-MCNC: <0.2 MG/DL (ref 0–0.3)
BILIRUB SERPL-MCNC: 0.2 MG/DL (ref 0–1.2)
BUN SERPL-MCNC: 8 MG/DL (ref 6–20)
BUN/CREAT SERPL: 8.1 (ref 7–25)
CALCIUM SPEC-SCNC: 9.6 MG/DL (ref 8.6–10.5)
CHLORIDE SERPL-SCNC: 101 MMOL/L (ref 98–107)
CO2 SERPL-SCNC: 29.8 MMOL/L (ref 22–29)
CREAT SERPL-MCNC: 0.99 MG/DL (ref 0.57–1)
DEPRECATED RDW RBC AUTO: 45.2 FL (ref 37–54)
EOSINOPHIL # BLD AUTO: 0.39 10*3/MM3 (ref 0–0.4)
EOSINOPHIL NFR BLD AUTO: 4.2 % (ref 0.3–6.2)
ERYTHROCYTE [DISTWIDTH] IN BLOOD BY AUTOMATED COUNT: 14.1 % (ref 12.3–15.4)
GFR SERPL CREATININE-BSD FRML MDRD: 62 ML/MIN/1.73
GLOBULIN UR ELPH-MCNC: 2.6 GM/DL
GLUCOSE SERPL-MCNC: 99 MG/DL (ref 65–99)
HCT VFR BLD AUTO: 40.5 % (ref 34–46.6)
HGB BLD-MCNC: 13.5 G/DL (ref 12–15.9)
HIV1 P24 AG SER QL: NORMAL
HIV1+2 AB SER QL: NORMAL
IMM GRANULOCYTES # BLD AUTO: 0.01 10*3/MM3 (ref 0–0.05)
IMM GRANULOCYTES NFR BLD AUTO: 0.1 % (ref 0–0.5)
LYMPHOCYTES # BLD AUTO: 3.43 10*3/MM3 (ref 0.7–3.1)
LYMPHOCYTES NFR BLD AUTO: 37 % (ref 19.6–45.3)
MCH RBC QN AUTO: 29.5 PG (ref 26.6–33)
MCHC RBC AUTO-ENTMCNC: 33.3 G/DL (ref 31.5–35.7)
MCV RBC AUTO: 88.4 FL (ref 79–97)
MONOCYTES # BLD AUTO: 0.98 10*3/MM3 (ref 0.1–0.9)
MONOCYTES NFR BLD AUTO: 10.6 % (ref 5–12)
NEUTROPHILS NFR BLD AUTO: 4.36 10*3/MM3 (ref 1.7–7)
NEUTROPHILS NFR BLD AUTO: 47.1 % (ref 42.7–76)
NRBC BLD AUTO-RTO: 0 /100 WBC (ref 0–0.2)
PLATELET # BLD AUTO: 354 10*3/MM3 (ref 140–450)
PMV BLD AUTO: 10.5 FL (ref 6–12)
POTASSIUM SERPL-SCNC: 4.8 MMOL/L (ref 3.5–5.2)
PROT SERPL-MCNC: 6.9 G/DL (ref 6–8.5)
RBC # BLD AUTO: 4.58 10*6/MM3 (ref 3.77–5.28)
SODIUM SERPL-SCNC: 139 MMOL/L (ref 136–145)
WBC NRBC COR # BLD: 9.26 10*3/MM3 (ref 3.4–10.8)

## 2022-01-24 PROCEDURE — 85025 COMPLETE CBC W/AUTO DIFF WBC: CPT

## 2022-01-24 PROCEDURE — 82248 BILIRUBIN DIRECT: CPT

## 2022-01-24 PROCEDURE — 80053 COMPREHEN METABOLIC PANEL: CPT

## 2022-01-24 PROCEDURE — G0432 EIA HIV-1/HIV-2 SCREEN: HCPCS

## 2022-01-24 PROCEDURE — 87899 AGENT NOS ASSAY W/OPTIC: CPT

## 2022-01-24 PROCEDURE — 36415 COLL VENOUS BLD VENIPUNCTURE: CPT

## 2022-02-01 RX ORDER — ALBUTEROL SULFATE 90 UG/1
AEROSOL, METERED RESPIRATORY (INHALATION)
Qty: 8.5 G | Refills: 3 | Status: SHIPPED | OUTPATIENT
Start: 2022-02-01 | End: 2022-05-13 | Stop reason: SDUPTHER

## 2022-02-01 RX ORDER — ALBUTEROL SULFATE 90 UG/1
2 AEROSOL, METERED RESPIRATORY (INHALATION) EVERY 6 HOURS PRN
Qty: 8.5 G | Refills: 0 | Status: CANCELLED | OUTPATIENT
Start: 2022-02-01

## 2022-02-01 NOTE — TELEPHONE ENCOUNTER
Rx Refill Note  Requested Prescriptions     Pending Prescriptions Disp Refills   • albuterol sulfate  (90 Base) MCG/ACT inhaler [Pharmacy Med Name: ALBUTEROL HFA INH (200 PUFFS)8.5GM] 8.5 g 3     Sig: INHALE 2 PUFFS BY MOUTH EVERY 6 HOURS AS NEEDED FOR WHEEZING      Last office visit with prescribing clinician: 10/27/2021      Next office visit with prescribing clinician: 2/1/2022            Raphael Ji MA  02/01/22, 08:37 EST

## 2022-02-08 DIAGNOSIS — G43.829 MENSTRUAL MIGRAINE WITHOUT STATUS MIGRAINOSUS, NOT INTRACTABLE: ICD-10-CM

## 2022-02-08 RX ORDER — RIZATRIPTAN BENZOATE 5 MG/1
5 TABLET ORAL DAILY PRN
Qty: 9 TABLET | Refills: 2 | Status: SHIPPED | OUTPATIENT
Start: 2022-02-08 | End: 2022-07-06 | Stop reason: SDUPTHER

## 2022-02-08 NOTE — TELEPHONE ENCOUNTER
Rx Refill Note  Requested Prescriptions     Pending Prescriptions Disp Refills   • rizatriptan (MAXALT) 5 MG tablet 9 tablet 2     Sig: Take 1 tablet by mouth Daily As Needed for Migraine. May repeat in 2 hours if needed      Last office visit with prescribing clinician: 10/27/2021      Next office visit with prescribing clinician: 2/17/2022            Rachel Rebolledo MA  02/08/22, 09:04 EST

## 2022-02-17 ENCOUNTER — HOSPITAL ENCOUNTER (OUTPATIENT)
Dept: GENERAL RADIOLOGY | Facility: HOSPITAL | Age: 42
Discharge: HOME | End: 2022-02-17
Admitting: FAMILY MEDICINE

## 2022-02-17 ENCOUNTER — OFFICE VISIT (OUTPATIENT)
Dept: FAMILY MEDICINE CLINIC | Facility: CLINIC | Age: 42
End: 2022-02-17

## 2022-02-17 VITALS
SYSTOLIC BLOOD PRESSURE: 130 MMHG | OXYGEN SATURATION: 94 % | HEIGHT: 68 IN | DIASTOLIC BLOOD PRESSURE: 90 MMHG | BODY MASS INDEX: 37.56 KG/M2 | HEART RATE: 83 BPM | WEIGHT: 247.8 LBS | RESPIRATION RATE: 16 BRPM

## 2022-02-17 DIAGNOSIS — M25.569 CHRONIC KNEE PAIN, UNSPECIFIED LATERALITY: ICD-10-CM

## 2022-02-17 DIAGNOSIS — G89.29 CHRONIC KNEE PAIN, UNSPECIFIED LATERALITY: ICD-10-CM

## 2022-02-17 DIAGNOSIS — M25.50 POLYARTHRALGIA: Primary | ICD-10-CM

## 2022-02-17 DIAGNOSIS — M25.562 ACUTE PAIN OF LEFT KNEE: ICD-10-CM

## 2022-02-17 DIAGNOSIS — M25.562 PATELLOFEMORAL ARTHRALGIA OF LEFT KNEE: ICD-10-CM

## 2022-02-17 PROCEDURE — 99214 OFFICE O/P EST MOD 30 MIN: CPT | Performed by: FAMILY MEDICINE

## 2022-02-17 PROCEDURE — 73562 X-RAY EXAM OF KNEE 3: CPT

## 2022-02-17 RX ORDER — GABAPENTIN 300 MG/1
300 CAPSULE ORAL 2 TIMES DAILY
Qty: 180 CAPSULE | Refills: 1 | Status: SHIPPED | OUTPATIENT
Start: 2022-02-17 | End: 2022-06-28 | Stop reason: SDUPTHER

## 2022-02-17 NOTE — PROGRESS NOTES
"Established Patient Office Visit      Patient Name: Shanae Alves  : 1980   MRN: [id number]   Care Team: Patient Care Team:  Junior Lujan DO as PCP - General (Family Medicine)    Chief Complaint:    Chief Complaint   Patient presents with   • Polyarthralgia       History of Present Illness: Shanae Alves is a 41 y.o. female who is here today for chief complaint.    HPI    The patient presents today for a regular 3-month follow-up on controlled substance monitoring. She is on gabapentin for polyarthralgia.    The patient states that she is doing okay. She reports that she is having a lot of trouble in her left knee and notes that it has been worse for 1.5 to 2 months. She states that she is not able to bend her knee completely and can not kneel on it. She denies any injury to her left knee. She states she has been using a heating pad at night, which she reports is somewhat helpful. She complains that her left knee hurts and becomes very stiff while sitting for a while. She reports consistent throbbing pain when she sits in the car. She denies performing any strengthening exercises or receiving therapy for her pain but reports that she has been trying to walk. She mentions that she has consistent pain above the patella. She reports that she is taking meloxicam every day. She mentions that she is not taking the diclofenac. She reports that she was \"rotating\" her knees, but she was not having any trouble at that point. She states that she had an impression that maybe she twisted her left knee, but it has not improved. She reports that she has not had any imaging of her left knee. She states that she needs a refill of gabapentin. She has a history of severe knee arthritis in her grandmother. She reports that she smokes a little less than 1 pack a day. She reports that she has quit smoking once in the past. She states that she denies smoking in the home.    This patient is accompanied by their " self who contributes to the history of their care.    The following portions of the patient's history were reviewed and updated as appropriate: allergies, current medications, past family history, past medical history, past social history, past surgical history and problem list.    Subjective      Review of Systems:   Review of Systems - See HPI    Past Medical History:   Past Medical History:   Diagnosis Date   • Arthritis    • Depression    • Headache    • Hyperlipidemia    • Hypothyroidism        Past Surgical History:   Past Surgical History:   Procedure Laterality Date   • CERVICAL CERCLAGE  2012       Family History:   Family History   Problem Relation Age of Onset   • Arthritis Mother    • Hyperlipidemia Mother    • Thyroid disease Mother    • Arthritis Father        Social History:   Social History     Socioeconomic History   • Marital status: Single   Tobacco Use   • Smoking status: Current Every Day Smoker     Packs/day: 1.00     Years: 20.00     Pack years: 20.00     Types: Cigarettes   • Smokeless tobacco: Never Used   Vaping Use   • Vaping Use: Never used   Substance and Sexual Activity   • Alcohol use: No   • Drug use: No   • Sexual activity: Defer       Tobacco History:   Social History     Tobacco Use   Smoking Status Current Every Day Smoker   • Packs/day: 1.00   • Years: 20.00   • Pack years: 20.00   • Types: Cigarettes   Smokeless Tobacco Never Used       Medications:     Current Outpatient Medications:   •  acyclovir (ZOVIRAX) 800 MG tablet, take 1 tablet by mouth daily FOR COLD SORE PREVENTION, Disp: , Rfl: 0  •  albuterol sulfate  (90 Base) MCG/ACT inhaler, INHALE 2 PUFFS BY MOUTH EVERY 6 HOURS AS NEEDED FOR WHEEZING, Disp: 8.5 g, Rfl: 3  •  atorvastatin (LIPITOR) 20 MG tablet, Take 1 tablet by mouth Daily., Disp: 90 tablet, Rfl: 1  •  buPROPion SR (WELLBUTRIN SR) 150 MG 12 hr tablet, Take 1 tablet by mouth Every Night., Disp: 270 tablet, Rfl: 1  •  diclofenac (VOLTAREN) 50 MG EC tablet,  "Take 1 tablet by mouth 2 (Two) Times a Day As Needed (knee pain)., Disp: 60 tablet, Rfl: 1  •  DULoxetine (CYMBALTA) 60 MG capsule, Take 1 capsule by mouth Daily., Disp: 90 capsule, Rfl: 1  •  gabapentin (NEURONTIN) 300 MG capsule, Take 1 capsule by mouth 2 (Two) Times a Day., Disp: 180 capsule, Rfl: 1  •  levothyroxine (SYNTHROID, LEVOTHROID) 25 MCG tablet, TAKE 1 TABLET BY MOUTH DAILY, Disp: 90 tablet, Rfl: 3  •  melatonin 5 MG tablet tablet, Take 1 tablet by mouth Every Night., Disp: 90 tablet, Rfl: 3  •  meloxicam (MOBIC) 15 MG tablet, Take 1 tablet by mouth Daily., Disp: 90 tablet, Rfl: 0  •  metFORMIN (GLUCOPHAGE) 500 MG tablet, Take 1 tablet by mouth 2 (Two) Times a Day With Meals., Disp: 180 tablet, Rfl: 1  •  rizatriptan (MAXALT) 5 MG tablet, Take 1 tablet by mouth Daily As Needed for Migraine. May repeat in 2 hours if needed, Disp: 9 tablet, Rfl: 2  •  SUBOXONE 8-2 MG film film, dissolve 2 FILMS under the tongue once daily, Disp: , Rfl: 0    Allergies:   No Known Allergies    Objective   Objective     Physical Exam:  Vital Signs:   Vitals:    02/17/22 0953   BP: 130/90   Pulse: 83   Resp: 16   SpO2: 94%   Weight: 112 kg (247 lb 12.8 oz)   Height: 172.7 cm (67.99\")     Body mass index is 37.69 kg/m².     Physical Exam  Nursing note reviewed  Const: NAD, A&Ox4, Pleasant, Cooperative  Eyes: EOMI, no conjunctivitis  ENT: No nasal discharge present, neck supple  Cardiac: Regular rate and rhythm, no cyanosis  Resp: Respiratory rate within normal limits, no increased work of breathing, no audible wheezing or retractions noted  GI: No distention or ascites  MSK: Motor and sensation grossly intact in bilateral upper extremities  Neurologic: CN II-XII grossly intact  Psych: Appropriate mood and behavior.  Skin: Warm, dry  Procedures/Radiology     Procedures  XR Knee 3 View Left    Result Date: 2/17/2022  No acute osseous abnormality. There is a small joint effusion. Mild osteoarthritic changes are present.  This " report was finalized on 2/17/2022 10:54 AM by Park Page MD.         Assessment/Plan   Assessment / Plan      Assessment/Plan:   Problems Addressed This Visit  Diagnoses and all orders for this visit:    1. Polyarthralgia (Primary)        - Gabapentin generally seems to help her general arthritis pain. I have recommended that she cut down her smoking as well and continue to work on mobility and stretching.  -     gabapentin (NEURONTIN) 300 MG capsule; Take 1 capsule by mouth 2 (Two) Times a Day.  Dispense: 180 capsule; Refill: 1    2. Acute pain of left knee        - Particularly over the last couple of months. Her pain today is consistent with patellofemoral arthralgia. She has not had imaging of her knee to this point. Recommended x-rays, which were ordered today. I will place a referral to physical therapy to work on peripatellar strengthening. If she is not improved in 6 weeks, we would consider an injection. No mechanical symptoms today, no swelling.  -     XR Knee 3 View Left; Future    3. Chronic knee pain, unspecified laterality    4. Patellofemoral arthralgia of left knee  -     Ambulatory Referral to Physical Therapy Evaluate and treat    5. Tobacco use        - Currently at about 1 pack per day. I recommended that she gradually cut down to half a pack per day over the next 6 weeks. Counseled on the risks of continued smoking, including cardiovascular effects as well as chronic pain effects. Total time spent counseling was 3 minutes.      Problem List Items Addressed This Visit     None      Visit Diagnoses     Polyarthralgia    -  Primary  Gabapentin generally seems to help her general arthritis pain. I have recommended that she cut down her smoking as well and continue to work on mobility and stretching    Relevant Medications    gabapentin (NEURONTIN) 300 MG capsule    Acute pain of left knee      Particularly over the last couple of months. Her pain today is consistent with patellofemoral arthralgia.  She has not had imaging of her knee to this point. Recommended x-rays, which were ordered today. I will place a referral to physical therapy to work on peripatellar strengthening. If she is not improved in 6 weeks, we would consider an injection. No mechanical symptoms today, no swelling.    Relevant Orders    XR Knee 3 View Left (Completed)    Chronic knee pain, unspecified laterality        Patellofemoral arthralgia of left knee        Relevant Orders    Ambulatory Referral to Physical Therapy Evaluate and treat          Tobacco use  Currently at about 1 pack per day. I recommended that she gradually cut down to half a pack per day over the next 6 weeks. Counseled on the risks of continued smoking, including cardiovascular effects as well as chronic pain effects. Total time spent counseling was 3 minutes.    There are no Patient Instructions on file for this visit.    Follow Up:   Return in about 6 weeks (around 3/31/2022) for follow up ortho issue.      DO TIMOTEO CadetE [address]  [facility]  [address] [facility] [address]  Fax [phone]  Phone [phone]    Transcribed from ambient dictation for Junior Lujan DO by ASHANTI MOSQUERA.  02/17/22   12:37 EST    Patient verbalized consent to the visit recording.  I have personally performed the services described in this document as transcribed by the above individual, and it is both accurate and complete.  Junior Lujan DO  2/28/2022  07:58 EST

## 2022-02-25 DIAGNOSIS — M25.569 CHRONIC KNEE PAIN, UNSPECIFIED LATERALITY: ICD-10-CM

## 2022-02-25 DIAGNOSIS — F32.A DEPRESSION, UNSPECIFIED DEPRESSION TYPE: ICD-10-CM

## 2022-02-25 DIAGNOSIS — G89.29 CHRONIC KNEE PAIN, UNSPECIFIED LATERALITY: ICD-10-CM

## 2022-02-25 RX ORDER — BUPROPION HYDROCHLORIDE 150 MG/1
150 TABLET, EXTENDED RELEASE ORAL NIGHTLY
Qty: 270 TABLET | Refills: 1 | Status: SHIPPED | OUTPATIENT
Start: 2022-02-25 | End: 2022-07-06 | Stop reason: SDUPTHER

## 2022-02-25 RX ORDER — MELOXICAM 15 MG/1
15 TABLET ORAL DAILY
Qty: 90 TABLET | Refills: 0 | Status: SHIPPED | OUTPATIENT
Start: 2022-02-25 | End: 2022-06-15 | Stop reason: SDUPTHER

## 2022-02-25 RX ORDER — ATORVASTATIN CALCIUM 20 MG/1
20 TABLET, FILM COATED ORAL DAILY
Qty: 90 TABLET | Refills: 1 | Status: SHIPPED | OUTPATIENT
Start: 2022-02-25 | End: 2022-07-06 | Stop reason: SDUPTHER

## 2022-02-25 NOTE — TELEPHONE ENCOUNTER
Rx Refill Note  Requested Prescriptions     Pending Prescriptions Disp Refills   • buPROPion SR (WELLBUTRIN SR) 150 MG 12 hr tablet 270 tablet 1     Sig: Take 1 tablet by mouth Every Night.   • meloxicam (MOBIC) 15 MG tablet 90 tablet 0     Sig: Take 1 tablet by mouth Daily.   • atorvastatin (LIPITOR) 20 MG tablet 90 tablet 1     Sig: Take 1 tablet by mouth Daily.      Last office visit with prescribing clinician: 2/17/2022      Next office visit with prescribing clinician: 4/1/2022            Alcon Heath MA  02/25/22, 09:32 EST

## 2022-05-12 RX ORDER — ALBUTEROL SULFATE 90 UG/1
AEROSOL, METERED RESPIRATORY (INHALATION)
Qty: 8.5 G | Refills: 3 | OUTPATIENT
Start: 2022-05-12

## 2022-05-12 NOTE — TELEPHONE ENCOUNTER
Rx Refill Note  Requested Prescriptions     Pending Prescriptions Disp Refills   • albuterol sulfate  (90 Base) MCG/ACT inhaler [Pharmacy Med Name: ALBUTEROL HFA INH (200 PUFFS)8.5GM] 8.5 g 3     Sig: INHALE 2 PUFFS BY MOUTH EVERY 6 HOURS AS NEEDED FOR WHEEZING      Last office visit with prescribing clinician: 2/17/2022      Next office visit with prescribing clinician: Visit date not found            Sandra Miramontes MA  05/12/22, 14:04 EDT

## 2022-05-13 DIAGNOSIS — R73.03 PREDIABETES: ICD-10-CM

## 2022-05-16 RX ORDER — ALBUTEROL SULFATE 90 UG/1
2 AEROSOL, METERED RESPIRATORY (INHALATION) EVERY 6 HOURS PRN
Qty: 8.5 G | Refills: 1 | Status: SHIPPED | OUTPATIENT
Start: 2022-05-16 | End: 2022-07-28 | Stop reason: SDUPTHER

## 2022-05-16 NOTE — TELEPHONE ENCOUNTER
Rx Refill Note  Requested Prescriptions     Pending Prescriptions Disp Refills   • metFORMIN (GLUCOPHAGE) 500 MG tablet 180 tablet 1     Sig: Take 1 tablet by mouth 2 (Two) Times a Day With Meals.   • albuterol sulfate  (90 Base) MCG/ACT inhaler 8.5 g 3     Sig: Inhale 2 puffs Every 6 (Six) Hours As Needed for Wheezing.      Last office visit with prescribing clinician: 2/17/2022      Next office visit with prescribing clinician: 6/6/2022}  Camryn Martinez MA  05/16/22, 07:54 EDT     Last fill: 02/01/2022-Inhaler  Last fill: 12/23/2021-Metformin

## 2022-06-15 DIAGNOSIS — M25.569 CHRONIC KNEE PAIN, UNSPECIFIED LATERALITY: ICD-10-CM

## 2022-06-15 DIAGNOSIS — G89.29 CHRONIC KNEE PAIN, UNSPECIFIED LATERALITY: ICD-10-CM

## 2022-06-15 RX ORDER — MELOXICAM 15 MG/1
15 TABLET ORAL DAILY
Qty: 90 TABLET | Refills: 0 | Status: SHIPPED | OUTPATIENT
Start: 2022-06-15 | End: 2022-09-15

## 2022-06-28 DIAGNOSIS — M25.50 POLYARTHRALGIA: ICD-10-CM

## 2022-06-28 RX ORDER — GABAPENTIN 300 MG/1
300 CAPSULE ORAL 2 TIMES DAILY
Qty: 180 CAPSULE | Refills: 1 | Status: SHIPPED | OUTPATIENT
Start: 2022-06-28 | End: 2022-10-21 | Stop reason: SDUPTHER

## 2022-06-28 RX ORDER — DULOXETIN HYDROCHLORIDE 60 MG/1
60 CAPSULE, DELAYED RELEASE ORAL DAILY
Qty: 90 CAPSULE | Refills: 1 | Status: SHIPPED | OUTPATIENT
Start: 2022-06-28 | End: 2022-11-09 | Stop reason: SDUPTHER

## 2022-06-28 NOTE — TELEPHONE ENCOUNTER
Rx Refill Note  Requested Prescriptions     Pending Prescriptions Disp Refills   • gabapentin (NEURONTIN) 300 MG capsule 180 capsule 1     Sig: Take 1 capsule by mouth 2 (Two) Times a Day.      Last office visit with prescribing clinician: 2/17/2022      Next office visit with prescribing clinician: 7/6/2022 23}  Camryn Martinez MA  06/28/22, 12:01 EDT     Last fill: 02/17/2022  UDS:Up to date  CSA:Up to date

## 2022-06-28 NOTE — TELEPHONE ENCOUNTER
Rx Refill Note  Requested Prescriptions     Pending Prescriptions Disp Refills   • DULoxetine (CYMBALTA) 60 MG capsule 90 capsule 1     Sig: Take 1 capsule by mouth Daily.      Last office visit with prescribing clinician: 02/17/2022  Next office visit with prescribing clinician:  07/06/2022  Camryn Martinez MA  06/28/22, 11:59 EDT     Last fill: 12/23/2021

## 2022-07-06 ENCOUNTER — OFFICE VISIT (OUTPATIENT)
Dept: FAMILY MEDICINE CLINIC | Facility: CLINIC | Age: 42
End: 2022-07-06

## 2022-07-06 VITALS
DIASTOLIC BLOOD PRESSURE: 80 MMHG | OXYGEN SATURATION: 98 % | RESPIRATION RATE: 16 BRPM | HEIGHT: 68 IN | WEIGHT: 255.2 LBS | BODY MASS INDEX: 38.68 KG/M2 | SYSTOLIC BLOOD PRESSURE: 130 MMHG | HEART RATE: 76 BPM

## 2022-07-06 DIAGNOSIS — R73.03 PREDIABETES: ICD-10-CM

## 2022-07-06 DIAGNOSIS — M25.50 POLYARTHRALGIA: Primary | ICD-10-CM

## 2022-07-06 DIAGNOSIS — E78.5 HYPERLIPIDEMIA, UNSPECIFIED HYPERLIPIDEMIA TYPE: ICD-10-CM

## 2022-07-06 DIAGNOSIS — F32.A DEPRESSION, UNSPECIFIED DEPRESSION TYPE: ICD-10-CM

## 2022-07-06 DIAGNOSIS — M25.562 PATELLOFEMORAL ARTHRALGIA OF LEFT KNEE: ICD-10-CM

## 2022-07-06 DIAGNOSIS — M25.569 CHRONIC KNEE PAIN, UNSPECIFIED LATERALITY: ICD-10-CM

## 2022-07-06 DIAGNOSIS — G43.829 MENSTRUAL MIGRAINE WITHOUT STATUS MIGRAINOSUS, NOT INTRACTABLE: ICD-10-CM

## 2022-07-06 DIAGNOSIS — G89.29 CHRONIC KNEE PAIN, UNSPECIFIED LATERALITY: ICD-10-CM

## 2022-07-06 DIAGNOSIS — E03.8 SUBCLINICAL HYPOTHYROIDISM: ICD-10-CM

## 2022-07-06 LAB
EXPIRATION DATE: NORMAL
HBA1C MFR BLD: 5.5 %
Lab: NORMAL

## 2022-07-06 PROCEDURE — 83036 HEMOGLOBIN GLYCOSYLATED A1C: CPT | Performed by: FAMILY MEDICINE

## 2022-07-06 PROCEDURE — 99214 OFFICE O/P EST MOD 30 MIN: CPT | Performed by: FAMILY MEDICINE

## 2022-07-06 RX ORDER — ATORVASTATIN CALCIUM 20 MG/1
20 TABLET, FILM COATED ORAL DAILY
Qty: 90 TABLET | Refills: 1 | Status: SHIPPED | OUTPATIENT
Start: 2022-07-06 | End: 2023-03-20

## 2022-07-06 RX ORDER — RIZATRIPTAN BENZOATE 5 MG/1
5 TABLET ORAL DAILY PRN
Qty: 9 TABLET | Refills: 2 | Status: SHIPPED | OUTPATIENT
Start: 2022-07-06

## 2022-07-06 RX ORDER — BUPROPION HYDROCHLORIDE 150 MG/1
150 TABLET, EXTENDED RELEASE ORAL NIGHTLY
Qty: 270 TABLET | Refills: 1 | Status: SHIPPED | OUTPATIENT
Start: 2022-07-06 | End: 2023-03-06

## 2022-07-06 NOTE — PROGRESS NOTES
Established Patient Office Visit      Patient Name: Shanae Alves  : 1980   MRN: [id number]   Care Team: Patient Care Team:  Junior Lujan DO as PCP - General (Family Medicine)    Chief Complaint:    Chief Complaint   Patient presents with   • Polyarthralgia   • Knee Pain     Left (been going on for a while and getting worse, needs new physical therapy order the last one canceled out before she could get scheduled)   • Med Refill   • Prediabetes       History of Present Illness: Shanae Alves is a 41 y.o. female who is here today for chief complaint.    HPI    The patient comes in for medication refills and talk to me again about her knee. She reports going for an x-ray at last appointment and then was sent to physical therapy. She notes they called her, she missed it, and they closed the order. The patient reports that she is taking the atorvastatin right now, denies any missed doses. She denies any problems with thyroid medication.    The patient notes her knee is to the point of almost debilitating, bending it to get in and out of the car and even walking sometimes in general. She admits when she gets up every morning it is still swollen. The patient admits that it is almost debilitating to do anything. She notes working with it trying to keep her range of motion decent. She notes icing it whenever she is able to but is not able to every day. The patient reports that sometimes she does not get a chance to even sit down until bedtime. She admits being on diclofenac in the past and did okay with it. She notes it is affecting her day to day life.    This patient is accompanied by their self who contributes to the history of their care.    The following portions of the patient's history were reviewed and updated as appropriate: allergies, current medications, past family history, past medical history, past social history, past surgical history and problem list.    Subjective      Review of  Systems:   Review of Systems - See HPI    Past Medical History:   Past Medical History:   Diagnosis Date   • Arthritis    • Depression    • Headache    • Hyperlipidemia    • Hypothyroidism        Past Surgical History:   Past Surgical History:   Procedure Laterality Date   • CERVICAL CERCLAGE  2012       Family History:   Family History   Problem Relation Age of Onset   • Arthritis Mother    • Hyperlipidemia Mother    • Thyroid disease Mother    • Arthritis Father        Social History:   Social History     Socioeconomic History   • Marital status: Single   Tobacco Use   • Smoking status: Current Every Day Smoker     Packs/day: 1.00     Years: 20.00     Pack years: 20.00     Types: Cigarettes   • Smokeless tobacco: Never Used   Vaping Use   • Vaping Use: Never used   Substance and Sexual Activity   • Alcohol use: No   • Drug use: No   • Sexual activity: Defer       Tobacco History:   Social History     Tobacco Use   Smoking Status Current Every Day Smoker   • Packs/day: 1.00   • Years: 20.00   • Pack years: 20.00   • Types: Cigarettes   Smokeless Tobacco Never Used       Medications:     Current Outpatient Medications:   •  acyclovir (ZOVIRAX) 800 MG tablet, take 1 tablet by mouth daily FOR COLD SORE PREVENTION, Disp: , Rfl: 0  •  albuterol sulfate  (90 Base) MCG/ACT inhaler, Inhale 2 puffs Every 6 (Six) Hours As Needed for Wheezing., Disp: 8.5 g, Rfl: 1  •  atorvastatin (LIPITOR) 20 MG tablet, Take 1 tablet by mouth Daily., Disp: 90 tablet, Rfl: 1  •  buPROPion SR (WELLBUTRIN SR) 150 MG 12 hr tablet, Take 1 tablet by mouth Every Night., Disp: 270 tablet, Rfl: 1  •  DULoxetine (CYMBALTA) 60 MG capsule, Take 1 capsule by mouth Daily., Disp: 90 capsule, Rfl: 1  •  gabapentin (NEURONTIN) 300 MG capsule, Take 1 capsule by mouth 2 (Two) Times a Day., Disp: 180 capsule, Rfl: 1  •  levothyroxine (SYNTHROID, LEVOTHROID) 25 MCG tablet, TAKE 1 TABLET BY MOUTH DAILY, Disp: 90 tablet, Rfl: 3  •  melatonin 5 MG tablet  "tablet, Take 1 tablet by mouth Every Night., Disp: 90 tablet, Rfl: 3  •  meloxicam (MOBIC) 15 MG tablet, Take 1 tablet by mouth Daily., Disp: 90 tablet, Rfl: 0  •  metFORMIN (GLUCOPHAGE) 500 MG tablet, Take 1 tablet by mouth 2 (Two) Times a Day With Meals., Disp: 180 tablet, Rfl: 1  •  rizatriptan (MAXALT) 5 MG tablet, Take 1 tablet by mouth Daily As Needed for Migraine. May repeat in 2 hours if needed, Disp: 9 tablet, Rfl: 2  •  SUBOXONE 8-2 MG film film, dissolve 2 FILMS under the tongue once daily, Disp: , Rfl: 0  •  diclofenac (VOLTAREN) 50 MG EC tablet, Take 1 tablet by mouth 2 (Two) Times a Day As Needed (pain)., Disp: 60 tablet, Rfl: 1    Allergies:   No Known Allergies    Objective   Objective     Physical Exam:  Vital Signs:   Vitals:    07/06/22 0927   BP: 130/80   Pulse: 76   Resp: 16   SpO2: 98%   Weight: 116 kg (255 lb 3.2 oz)   Height: 172.7 cm (67.99\")     Body mass index is 38.81 kg/m².     Physical Exam  Nursing note reviewed  Const: NAD, A&Ox4, Pleasant, Cooperative  Eyes: EOMI, no conjunctivitis  ENT: No nasal discharge present, neck supple  Cardiac: Regular rate and rhythm, no cyanosis  Resp: Respiratory rate within normal limits, no increased work of breathing, no audible wheezing or retractions noted  GI: No distention or ascites  MSK: Motor and sensation grossly intact in bilateral upper extremities  Neurologic: CN II-XII grossly intact  Psych: Appropriate mood and behavior.  Skin: Warm, dry  Procedures/Radiology     Procedures  No radiology results for the last 7 days     Assessment & Plan   Assessment / Plan      Assessment/Plan:   Problems Addressed This Visit  Diagnoses and all orders for this visit:    1. Polyarthralgia (Primary)    2. Menstrual migraine without status migrainosus, not intractable  -     rizatriptan (MAXALT) 5 MG tablet; Take 1 tablet by mouth Daily As Needed for Migraine. May repeat in 2 hours if needed  Dispense: 9 tablet; Refill: 2    3. Depression, unspecified " depression type  -     buPROPion SR (WELLBUTRIN SR) 150 MG 12 hr tablet; Take 1 tablet by mouth Every Night.  Dispense: 270 tablet; Refill: 1    4. Chronic knee pain, unspecified laterality    5. Patellofemoral arthralgia of left knee  -     Ambulatory Referral to Physical Therapy Evaluate and treat    6. Hyperlipidemia, unspecified hyperlipidemia type  -     atorvastatin (LIPITOR) 20 MG tablet; Take 1 tablet by mouth Daily.  Dispense: 90 tablet; Refill: 1  -     Lipid Panel; Future  -     Comprehensive Metabolic Panel; Future    7. Subclinical hypothyroidism  -     TSH Rfx On Abnormal To Free T4; Future    8. Prediabetes  -     Hemoglobin A1c; Future  -     POC Glycosylated Hemoglobin (Hb A1C)      Problem List Items Addressed This Visit        Endocrine and Metabolic    Subclinical hypothyroidism    Relevant Orders    TSH Rfx On Abnormal To Free T4    Prediabetes    Overview     A1c 6.11% November 2019           Relevant Orders    Hemoglobin A1c    POC Glycosylated Hemoglobin (Hb A1C) (Completed)       Mental Health    Depression    Relevant Medications    buPROPion SR (WELLBUTRIN SR) 150 MG 12 hr tablet      Other Visit Diagnoses     Polyarthralgia    -  Primary    Menstrual migraine without status migrainosus, not intractable        Relevant Medications    rizatriptan (MAXALT) 5 MG tablet    buPROPion SR (WELLBUTRIN SR) 150 MG 12 hr tablet    Chronic knee pain, unspecified laterality        Patellofemoral arthralgia of left knee        Relevant Orders    Ambulatory Referral to Physical Therapy Evaluate and treat    Hyperlipidemia, unspecified hyperlipidemia type        Relevant Medications    atorvastatin (LIPITOR) 20 MG tablet    Other Relevant Orders    Lipid Panel    Comprehensive Metabolic Panel          1. Left knee pain.  - Referral placed for physical therapy. Prescription sent for meloxicam to be taken once daily. If she is still in agonizing pain after a couple of weeks of physical therapy, she will  call for appointment for a joint injection.    Patient Instructions       Follow Up:   Return in about 3 months (around 10/6/2022) for Controlled substance 3-month.    DO TIMOTEO CadetE [address]  [facility]  [address] [facility] [address]  Fax [phone]  Phone [phone]       Transcribed from ambient dictation for Junior Lujan DO by PREMA MORALES.  07/06/22   10:50 EDT    Patient verbalized consent to the visit recording.  I have personally performed the services described in this document as transcribed by the above individual, and it is both accurate and complete.  Junior Lujan DO  7/18/2022  15:00 EDT

## 2022-07-15 ENCOUNTER — TELEPHONE (OUTPATIENT)
Dept: FAMILY MEDICINE CLINIC | Facility: CLINIC | Age: 42
End: 2022-07-15

## 2022-07-15 DIAGNOSIS — M25.562 ACUTE PAIN OF LEFT KNEE: ICD-10-CM

## 2022-07-15 NOTE — TELEPHONE ENCOUNTER
Spoke with patient she says that her last apt she told you the Meloxicam wasn't working and you were going to send something in that she could take BID

## 2022-07-15 NOTE — TELEPHONE ENCOUNTER
PATIENT CALLED TO CHECK ON A MESSAGE THAT WAS SENT ON 7/12/22. SHE SAID THAT SHE IS OUT OF MELOXICAM.

## 2022-07-28 ENCOUNTER — TREATMENT (OUTPATIENT)
Dept: PHYSICAL THERAPY | Facility: CLINIC | Age: 42
End: 2022-07-28

## 2022-07-28 DIAGNOSIS — G89.29 CHRONIC PAIN OF LEFT KNEE: Primary | ICD-10-CM

## 2022-07-28 DIAGNOSIS — M25.562 PATELLOFEMORAL ARTHRALGIA OF LEFT KNEE: ICD-10-CM

## 2022-07-28 DIAGNOSIS — M25.562 CHRONIC PAIN OF LEFT KNEE: Primary | ICD-10-CM

## 2022-07-28 DIAGNOSIS — M76.52 PATELLAR TENDINITIS OF LEFT KNEE: ICD-10-CM

## 2022-07-28 PROCEDURE — 97140 MANUAL THERAPY 1/> REGIONS: CPT | Performed by: PHYSICAL THERAPIST

## 2022-07-28 PROCEDURE — 97161 PT EVAL LOW COMPLEX 20 MIN: CPT | Performed by: PHYSICAL THERAPIST

## 2022-07-28 RX ORDER — ALBUTEROL SULFATE 90 UG/1
2 AEROSOL, METERED RESPIRATORY (INHALATION) EVERY 6 HOURS PRN
Qty: 8.5 G | Refills: 1 | Status: SHIPPED | OUTPATIENT
Start: 2022-07-28 | End: 2022-09-19 | Stop reason: SDUPTHER

## 2022-07-28 NOTE — TELEPHONE ENCOUNTER
Rx Refill Note  Requested Prescriptions     Pending Prescriptions Disp Refills   • albuterol sulfate  (90 Base) MCG/ACT inhaler 8.5 g 1     Sig: Inhale 2 puffs Every 6 (Six) Hours As Needed for Wheezing.      Last office visit with prescribing clinician: 7/6/2022      Next office visit with prescribing clinician: 10/12/2022            Candace Solis MA  07/28/22, 14:13 EDT

## 2022-07-28 NOTE — PROGRESS NOTES
Physical Therapy Initial Evaluation and Plan of Care      Patient: Shanae Alves   : 1980  Diagnosis/ICD-10 Code:  No primary diagnosis found.  Referring practitioner: Junior Lujan DO    Subjective Evaluation    History of Present Illness  Date of onset: 2021  Mechanism of injury: Pt reports that she began having insidious onset of L knee pain about a year ago. Pt reports that she was taking meloxicam and her PCP changed her to dyclofinac which has seemed to help. Pt reports that squatting, getting in and out of the car, and doing stairs make pain worse. Pt reports that she walked almost daily before her pain started. Pt reports that movement helps relieve the pain but prolonged ambulation makes pain worse. Pt denies knee giving way or catching.       Patient Occupation: [employer]  Current pain ratin  At best pain ratin  At worst pain ratin  Quality: throbbing and dull ache  Relieving factors: medications and heat  Aggravating factors: squatting, ambulation, stairs and sleeping  Progression: improved    Social Support  Lives in: one-story house  Lives with: young children    Diagnostic Tests  X-ray: normal    Treatments  Previous treatment: medication  Current treatment: medication  Patient Goals  Patient goals for therapy: decreased pain, increased motion, increased strength and return to sport/leisure activities             Objective          Palpation   Left   Tenderness of the distal semimembranosus and distal semitendinosus.     Tenderness   Left Knee   Tenderness in the inferior patella, medial joint line, medial patella and patellar tendon. No tenderness in the ITB, LCL (distal), LCL (proximal), MCL (distal) and MCL (proximal).     Neurological Testing     Reflexes   Left   Patellar (L4): normal (2+)  Achilles (S1): absent (0)    Right   Patellar (L4): normal (2+)  Achilles (S1): absent (0)    Active Range of Motion     Right Knee   Flexion: 132  degrees   Extension: 0 degrees     Patellar Mobility   Left Knee Hypomobile in the left medial, left lateral, left superior and left inferior patellar tendon(s).     Right Knee Hypomobile in the medial, lateral, superior and inferior patellar tendon(s).     Strength/Myotome Testing     Left Hip   Planes of Motion   Flexion: 4  Abduction: 4    Right Hip   Planes of Motion   Flexion: 4+  Abduction: 4-    Left Knee   Flexion: 4-  Extension: 4-    Right Knee   Flexion: 4+  Extension: 5    Left Ankle/Foot   Dorsiflexion: 5    Right Ankle/Foot   Dorsiflexion: 5    Tests     Left Hip   Adryan: Positive.     Left Knee   Negative patellar apprehension, patellar compression, patella-femoral grind, Thessaly's test at 5 degrees and Thessaly's test at 20 degrees.     Right Knee   Negative Thessaly's test at 5 degrees and Thessaly's test at 20 degrees.     Additional Tests Details  Negative mcconnel test    Ambulation     Quality of Movement During Gait     Pelvis    Pelvis (Left): Positive Trendelenburg.   Pelvis (Right): Positive Trendelenburg.           Assessment & Plan     Assessment  Impairments: abnormal muscle firing, abnormal or restricted ROM, activity intolerance, impaired physical strength, lacks appropriate home exercise program, pain with function and weight-bearing intolerance  Functional Limitations: sleeping, walking, uncomfortable because of pain and sitting  Assessment details: Patient is a 41 year old female who comes to physical therapy with chronic L knee pain. Signs and symptoms are consistent with patellar tendonosis resulting in pain, decreased ROM, decreased strength, and inability to perform all essential functional activities. Pt will benefit from skilled PT services to address the above issues.     Prognosis: good    Goals  Plan Goals: SHORT TERM GOALS:  2 weeks       1. Pt independent with HEP  2. Pt to demonstrate gio hip strength 4+/5 or greater to improve stability with ambulation  3. Pt will have  full L knee PROM equal to R knee PROM    LONG TERM GOALS:   6 weeks  1. Pt to demonstrate ability to perform full functional squat with good form and control of the knees and without increasing pain  2. Pt will have full AROM in the L knee equal to the R knee  3. Pt to return to work full duty without increased pain in the left knee   4. Pt to demonstrate ability to perform step up/down 8 inch step x10 safely and without pain in the left knee       Plan  Therapy options: will be seen for skilled therapy services  Planned modality interventions: cryotherapy, dry needling, ultrasound, TENS and iontophoresis  Planned therapy interventions: abdominal trunk stabilization, balance/weight-bearing training, flexibility, functional ROM exercises, home exercise program, joint mobilization, manual therapy, neuromuscular re-education, motor coordination training, soft tissue mobilization, strengthening, stretching and therapeutic activities  Frequency: 2x week  Duration in weeks: 8  Treatment plan discussed with: patient        Manual Therapy:    10     mins  33803;  Therapeutic Exercise:         mins  17236;     Neuromuscular Tiffany:        mins  45053;    Therapeutic Activity:          mins  02222;     Gait Training:           mins  34948;     Ultrasound:          mins  09859;    Electrical Stimulation:         mins  15914 ( );  Dry Needling          mins self-pay    Timed Treatment:   10   mins   Total Treatment:     48   mins    PT SIGNATURE: Juan Malik PT   [place] License: [id number]  DATE TREATMENT INITIATED: 7/28/2022    Initial Certification  Certification Period: 10/25/2022  I certify that the therapy services are furnished while this patient is under my care.  The services outlined above are required by this patient, and will be reviewed every 90 days.     PHYSICIAN: Junior Lujan,       DATE:     Please sign and return via fax to [phone].. Thank you, [facility].

## 2022-08-02 ENCOUNTER — TREATMENT (OUTPATIENT)
Dept: PHYSICAL THERAPY | Facility: CLINIC | Age: 42
End: 2022-08-02

## 2022-08-02 DIAGNOSIS — M25.562 CHRONIC PAIN OF LEFT KNEE: Primary | ICD-10-CM

## 2022-08-02 DIAGNOSIS — M25.562 PATELLOFEMORAL ARTHRALGIA OF LEFT KNEE: ICD-10-CM

## 2022-08-02 DIAGNOSIS — G89.29 CHRONIC PAIN OF LEFT KNEE: Primary | ICD-10-CM

## 2022-08-02 DIAGNOSIS — M76.52 PATELLAR TENDINITIS OF LEFT KNEE: ICD-10-CM

## 2022-08-02 PROCEDURE — 97110 THERAPEUTIC EXERCISES: CPT | Performed by: PHYSICAL THERAPIST

## 2022-08-02 PROCEDURE — 97140 MANUAL THERAPY 1/> REGIONS: CPT | Performed by: PHYSICAL THERAPIST

## 2022-08-02 NOTE — PROGRESS NOTES
Physical Therapy Daily Treatment Note      Visit #: 2    [facility] reports 3/10 pain today at rest.  Pt reports that her L knee is feeling pretty good today. Pt states that the HEP is going well although she continues to have some pain although not nearly as bad as before beginning stretches.         Objective Pt present to PT today with no distress at rest.     Pt with tenderness in the anterior L knee over patellar tendon.    Pt with some pain with quad sets when stressing the Patellar tendon.     Pt with no increased pain in the L knee following activities in the clinic today.       See Exercise, Manual, and Modality Logs for complete treatment.     Assessment/Plan  Pt continues to have pain in the L patellar tendon and anterior knee. Pt to continue with HEP and will progress with activities in the clinic as tolerated. Pt to continue with PT to help improve L knee motion, pain, and function.       Progress per Plan of Care      Visit Diagnosis:    ICD-10-CM ICD-9-CM   1. Chronic pain of left knee  M25.562 719.46    G89.29 338.29   2. Patellar tendinitis of left knee  M76.52 726.64   3. Patellofemoral arthralgia of left knee  M25.562 719.46            Manual Therapy:    17     mins  12796;  Therapeutic Exercise:    25     mins  43790;     Neuromuscular Tiffany:        mins  23357;    Therapeutic Activity:          mins  66635;     Gait Training:           mins  45981;     Ultrasound:          mins  88548;    Electrical Stimulation:         mins  25189 ( );  Dry Needling          mins self-pay  Iontophoresis          mins 31779      Timed Treatment:   43   mins   Total Treatment:     47   mins    Juan Malik, PT  Physical Therapist

## 2022-08-11 ENCOUNTER — TREATMENT (OUTPATIENT)
Dept: PHYSICAL THERAPY | Facility: CLINIC | Age: 42
End: 2022-08-11

## 2022-08-11 DIAGNOSIS — G89.29 CHRONIC PAIN OF LEFT KNEE: Primary | ICD-10-CM

## 2022-08-11 DIAGNOSIS — M25.562 CHRONIC PAIN OF LEFT KNEE: Primary | ICD-10-CM

## 2022-08-11 DIAGNOSIS — M25.562 PATELLOFEMORAL ARTHRALGIA OF LEFT KNEE: ICD-10-CM

## 2022-08-11 DIAGNOSIS — M76.52 PATELLAR TENDINITIS OF LEFT KNEE: ICD-10-CM

## 2022-08-11 PROCEDURE — 97112 NEUROMUSCULAR REEDUCATION: CPT | Performed by: PHYSICAL THERAPIST

## 2022-08-11 PROCEDURE — 97140 MANUAL THERAPY 1/> REGIONS: CPT | Performed by: PHYSICAL THERAPIST

## 2022-08-11 NOTE — PROGRESS NOTES
Physical Therapy Daily Treatment Note      Visit #: 3    [facility] reports 0/10 pain today at rest.  Pt reports that she has 3/10 pain when walking today. Pt reports that the ice helped last visit. Pt states that she feels better than when she first came into therapy although her knee was very sore and painful over the weekend because she did a lot of walking.         Objective Pt present to PT today with no distress at rest.     Pt educated to perform QS to point of tolerance and not to push into pain.     Pt with no increased pain following activities today.       See Exercise, Manual, and Modality Logs for complete treatment.     Assessment/Plan  Pt continues to have pain in the L knee that is worse with ambulation and WB activities. Pt is showing improvements although still has pain. Pt to continue with PT to help improve L knee pain, function, and activity tolerance.       Progress per Plan of Care      Visit Diagnosis:    ICD-10-CM ICD-9-CM   1. Chronic pain of left knee  M25.562 719.46    G89.29 338.29   2. Patellar tendinitis of left knee  M76.52 726.64   3. Patellofemoral arthralgia of left knee  M25.562 719.46            Manual Therapy:    15     mins  96488;  Therapeutic Exercise:         mins  94503;     Neuromuscular Tiffany:    13    mins  65845;    Therapeutic Activity:          mins  66374;     Gait Training:           mins  22589;     Ultrasound:          mins  64839;    Electrical Stimulation:         mins  12942 ( );  Dry Needling          mins self-pay  Iontophoresis          mins 36772      Timed Treatment:   28   mins   Total Treatment:     55   mins    Juan Malik, PT  Physical Therapist

## 2022-08-16 ENCOUNTER — TREATMENT (OUTPATIENT)
Dept: PHYSICAL THERAPY | Facility: CLINIC | Age: 42
End: 2022-08-16

## 2022-08-16 DIAGNOSIS — M25.562 CHRONIC PAIN OF LEFT KNEE: Primary | ICD-10-CM

## 2022-08-16 DIAGNOSIS — G89.29 CHRONIC PAIN OF LEFT KNEE: Primary | ICD-10-CM

## 2022-08-16 DIAGNOSIS — M25.562 PATELLOFEMORAL ARTHRALGIA OF LEFT KNEE: ICD-10-CM

## 2022-08-16 DIAGNOSIS — M76.52 PATELLAR TENDINITIS OF LEFT KNEE: ICD-10-CM

## 2022-08-16 PROCEDURE — 97110 THERAPEUTIC EXERCISES: CPT | Performed by: PHYSICAL THERAPIST

## 2022-08-16 PROCEDURE — 97140 MANUAL THERAPY 1/> REGIONS: CPT | Performed by: PHYSICAL THERAPIST

## 2022-08-16 NOTE — PROGRESS NOTES
"Physical Therapy Daily Treatment Note      Visit #: 4    [facility] reports 4-5/10 pain today at rest.  Pt reports that her L knee feels much better when walking and performing WB activities. Pt states that her L knee aches and is painful when sitting with the knee bent for a prolonged period.         Objective Pt present to PT today with no distress at rest.     Pt with notable swelling in the L knee and ankle today.     Pt with good motion in the L knee .     Pt performed LAQ with 2\" squeeze instead of QS today because it was more comfortable on the knee.       See Exercise, Manual, and Modality Logs for complete treatment.     Assessment/Plan  Pt continues to have pain in the knee that have become more prevalent when sitting with knee bent. Pt has less pain WB and with ambulation. Pt to follow up next week and continue with treatment as tolerated. PT discussed seeing her doctor and suggested a steroid injection may benefit her.       Progress per Plan of Care      Visit Diagnosis:    ICD-10-CM ICD-9-CM   1. Chronic pain of left knee  M25.562 719.46    G89.29 338.29   2. Patellar tendinitis of left knee  M76.52 726.64   3. Patellofemoral arthralgia of left knee  M25.562 719.46            Manual Therapy:    13     mins  50263;  Therapeutic Exercise:    25     mins  28962;     Neuromuscular Tiffany:        mins  19895;    Therapeutic Activity:          mins  49499;     Gait Training:           mins  28478;     Ultrasound:          mins  83790;    Electrical Stimulation:         mins  38162 ( );  Dry Needling          mins self-pay  Iontophoresis          mins 62330      Timed Treatment:   38   mins   Total Treatment:     60   mins    Juan Malik, PT  Physical Therapist        "

## 2022-09-15 ENCOUNTER — PROCEDURE VISIT (OUTPATIENT)
Dept: FAMILY MEDICINE CLINIC | Facility: CLINIC | Age: 42
End: 2022-09-15

## 2022-09-15 VITALS
OXYGEN SATURATION: 100 % | BODY MASS INDEX: 39.06 KG/M2 | DIASTOLIC BLOOD PRESSURE: 90 MMHG | TEMPERATURE: 99.1 F | HEART RATE: 83 BPM | WEIGHT: 256.8 LBS | SYSTOLIC BLOOD PRESSURE: 138 MMHG

## 2022-09-15 DIAGNOSIS — M25.562 ACUTE PAIN OF LEFT KNEE: Primary | ICD-10-CM

## 2022-09-15 DIAGNOSIS — M25.562 PATELLOFEMORAL ARTHRALGIA OF LEFT KNEE: ICD-10-CM

## 2022-09-15 PROCEDURE — 99213 OFFICE O/P EST LOW 20 MIN: CPT | Performed by: FAMILY MEDICINE

## 2022-09-15 PROCEDURE — 20610 DRAIN/INJ JOINT/BURSA W/O US: CPT | Performed by: FAMILY MEDICINE

## 2022-09-15 RX ORDER — PHENTERMINE HYDROCHLORIDE 37.5 MG/1
TABLET ORAL
COMMUNITY
Start: 2022-09-01

## 2022-09-15 RX ORDER — TRIAMCINOLONE ACETONIDE 40 MG/ML
40 INJECTION, SUSPENSION INTRA-ARTICULAR; INTRAMUSCULAR ONCE
Status: COMPLETED | OUTPATIENT
Start: 2022-09-15 | End: 2022-09-15

## 2022-09-15 RX ADMIN — TRIAMCINOLONE ACETONIDE 40 MG: 40 INJECTION, SUSPENSION INTRA-ARTICULAR; INTRAMUSCULAR at 13:37

## 2022-09-15 NOTE — PROGRESS NOTES
Subjective   Shanae Alves is a 42 y.o. female.     Chief Complaint   Patient presents with   • Injections     Left knee injection       History of Present Illness     Shanae Alves presents today for   Chief Complaint   Patient presents with   • Injections     Left knee injection       Shanae presents today for a follow-up on left knee pain. I saw her in 07/2022 for the same, and made a referral to physical therapy. She has been working with them to this point.    She reports she went to physical therapy for 4 weeks. The first week she went a couple of times, and then the next 2 weeks she went once per week. She has not been back to physical therapy since then. She is doing home exercises. She has a yoga ball and a band around her knee, which has helped. The swelling has not gone away, and the pain has not gone away. She reports it is to the point where it is frustrating. She was unable to go to the water park with her children this summer because she can not squat down to get on the rash to go down. She denies having an injection in her knee in the past.    She denies being allergic to any steroids or anesthetics.    This patient is accompanied by their self who contributes to the history of their care.    The following portions of the patient's history were reviewed and updated as appropriate: allergies, current medications, past family history, past medical history, past social history, past surgical history and problem list.    Active Ambulatory Problems     Diagnosis Date Noted   • Family history of hypothyroidism 11/13/2019   • Personal history of tobacco use, presenting hazards to health 11/13/2019   • Subclinical hypothyroidism 11/20/2019   • Prediabetes 11/20/2019   • Depression 01/14/2021     Resolved Ambulatory Problems     Diagnosis Date Noted   • No Resolved Ambulatory Problems     Past Medical History:   Diagnosis Date   • Arthritis    • Headache    • Hyperlipidemia    • Hypothyroidism       Past Surgical History:   Procedure Laterality Date   • CERVICAL CERCLAGE  2012     Family History   Problem Relation Age of Onset   • Arthritis Mother    • Hyperlipidemia Mother    • Thyroid disease Mother    • Arthritis Father      Social History     Socioeconomic History   • Marital status: Single   Tobacco Use   • Smoking status: Current Every Day Smoker     Packs/day: 1.00     Years: 20.00     Pack years: 20.00     Types: Cigarettes   • Smokeless tobacco: Never Used   Vaping Use   • Vaping Use: Never used   Substance and Sexual Activity   • Alcohol use: No   • Drug use: No   • Sexual activity: Defer       Review of Systems  Review of Systems -  General ROS: negative for - chills, fever or night sweats  MSK: see HPI  Skin: negative for recent or new-onset erythema or rash    Objective   Blood pressure 138/90, pulse 83, temperature 99.1 °F (37.3 °C), temperature source Infrared, weight 116 kg (256 lb 12.8 oz), last menstrual period 08/25/2022, SpO2 100 %, not currently breastfeeding.  Nursing note reviewed  Physical Exam  Const: NAD, A&Ox4, Pleasant, Cooperative  Skin: No overlying erythema  MSK: Trace effusion left knee  Procedures  Procedure: Injection of the left knee joint  Prior to performance of the knee injection, a discussion of this procedure and alternative treatments was conducted with the patient.  Possible complications were discussed, and all questions were answered.  An informed consent was reviewed with the patient, and a signed copy will be scanned into the chart.     Anterolateral Peripatellar Approach  An anterolateral peripatellar approach was used.  The patient is seated at the edge of the exam table with the left knee flexed to 90° with the lower leg overhanging the edge.  The site was identified and marked below the inferior pole of the patella and 2 cm laterally.  The area was aseptically prepped.  The site was topically anesthetized and distracted using ethyl chloride. A 25-gauge 1-1/2  inch needle was then used to introduce an injectate consisting of 1 cc of 40 mg/mL triamcinolone and 2 cc of 1% lidocaine. Proper placement was confirmed via joint fluid aspiration into the syringe.     The needle was withdrawn.  No bleeding was encountered.  The injection site was cleaned and a dry sterile dressing was applied.     The patient tolerated the procedure well without complications. she reported relief of pain within 5 minutes.  Assessment & Plan   Problem List Items Addressed This Visit    None     Visit Diagnoses     Acute pain of left knee    -  Primary    Relevant Medications    triamcinolone acetonide (KENALOG-40) injection 40 mg (Completed)    Patellofemoral arthralgia of left knee        Relevant Medications    triamcinolone acetonide (KENALOG-40) injection 40 mg (Completed)        1. Left knee pain.  - She will receive a left knee injection today. I advised her that she may feel some fullness as the medication goes into the knee joint. I advised her that the numbing medication will start to take effect over the next 24 to 72 hours. I advised her that there is a very rare reaction called a post injection flare. I advised her that it is nothing of concern as long as it is not red, warm, or warm, and there is no infection, it is not a problem. I advised her to take extra Tylenol as needed. I advised her to continue doing the home exercises. She will follow up in 6 weeks.    Injection today, tolerated well. Imaging reviewed today at point of care together with patient. Post-injection instructions reviewed with patient. I would like to see her back in about 6 weeks to assess her progress and response to injection.     See patient diagnoses and orders along with patient instructions for assessment, plan, and changes to care for patient.    Patient Instructions   INSTRUCTIONS TO FOLLOW AFTER A CORTISONE INJECTION    1. The pain relieving medicine in the shot will wear off in the next 12-18 hours. After  this, the pain at the site may return for a short time until the steroid takes effect 24-48 hours later.  2. Use ice tonight 30-60 minutes or longer to minimize swelling and discomfort that might follow the injection. (Either crushed ice in a plastic bag or crushed ice in an ice cap).  3. Begin heat tomorrow for at least one hour every day for one week. Place hot moist towels over the injection area, cover towel with plastic then apply a heating pad over the entire area. You may use contrast therapy, with ice for 20 minutes immediately following the heat.  4. Relative rest should be undertaken. You may resume normal non-painful activities.       Return in about 6 weeks (around 10/27/2022) for follow-up after injection (Tai only).    Ambulatory progress note signed and attested to by Junior Lujan D.O.            Transcribed from ambient dictation for Junior Lujan DO by GAVIN VALDES.  09/15/22   14:08 EDT    Patient verbalized consent to the visit recording.  I have personally performed the services described in this document as transcribed by the above individual, and it is both accurate and complete.  Junior Lujan DO  9/22/2022  08:05 EDT

## 2022-09-15 NOTE — PATIENT INSTRUCTIONS
INSTRUCTIONS TO FOLLOW AFTER A CORTISONE INJECTION    1. The pain relieving medicine in the shot will wear off in the next 12-18 hours. After this, the pain at the site may return for a short time until the steroid takes effect 24-48 hours later.  2. Use ice tonight 30-60 minutes or longer to minimize swelling and discomfort that might follow the injection. (Either crushed ice in a plastic bag or crushed ice in an ice cap).  3. Begin heat tomorrow for at least one hour every day for one week. Place hot moist towels over the injection area, cover towel with plastic then apply a heating pad over the entire area. You may use contrast therapy, with ice for 20 minutes immediately following the heat.  4. Relative rest should be undertaken. You may resume normal non-painful activities.

## 2022-09-19 DIAGNOSIS — R60.0 PEDAL EDEMA: ICD-10-CM

## 2022-09-19 DIAGNOSIS — M25.562 ACUTE PAIN OF LEFT KNEE: ICD-10-CM

## 2022-09-19 DIAGNOSIS — E03.8 SUBCLINICAL HYPOTHYROIDISM: ICD-10-CM

## 2022-09-19 RX ORDER — LEVOTHYROXINE SODIUM 0.03 MG/1
25 TABLET ORAL DAILY
Qty: 30 TABLET | Refills: 0 | Status: SHIPPED | OUTPATIENT
Start: 2022-09-19 | End: 2023-03-07 | Stop reason: DRUGHIGH

## 2022-09-19 RX ORDER — ALBUTEROL SULFATE 90 UG/1
2 AEROSOL, METERED RESPIRATORY (INHALATION) EVERY 6 HOURS PRN
Qty: 8.5 G | Refills: 1 | Status: SHIPPED | OUTPATIENT
Start: 2022-09-19 | End: 2022-11-28

## 2022-09-19 RX ORDER — LEVOTHYROXINE SODIUM 0.03 MG/1
25 TABLET ORAL DAILY
Qty: 90 TABLET | OUTPATIENT
Start: 2022-09-19

## 2022-09-19 NOTE — TELEPHONE ENCOUNTER
Rx Refill Note  Requested Prescriptions     Pending Prescriptions Disp Refills   • levothyroxine (SYNTHROID, LEVOTHROID) 25 MCG tablet 30 tablet 0     Sig: Take 1 tablet by mouth Daily.   • diclofenac (VOLTAREN) 50 MG EC tablet 60 tablet 1     Sig: Take 1 tablet by mouth 2 (Two) Times a Day As Needed (pain).   • albuterol sulfate  (90 Base) MCG/ACT inhaler 8.5 g 1     Sig: Inhale 2 puffs Every 6 (Six) Hours As Needed for Wheezing.      Last office visit with prescribing clinician: 7/6/2022      Next office visit with prescribing clinician: 10/12/2022            Tejinder Garcia MA  09/19/22, 16:22 EDT

## 2022-10-21 DIAGNOSIS — M25.50 POLYARTHRALGIA: ICD-10-CM

## 2022-10-24 NOTE — TELEPHONE ENCOUNTER
Rx Refill Note  Requested Prescriptions     Pending Prescriptions Disp Refills   • gabapentin (NEURONTIN) 300 MG capsule 180 capsule 1     Sig: Take 1 capsule by mouth 2 (Two) Times a Day.      Last office visit with prescribing clinician: 7/6/2022      Next office visit with prescribing clinician: 10/25/2022            Candace Solis MA  10/24/22, 07:59 EDT

## 2022-10-25 ENCOUNTER — OFFICE VISIT (OUTPATIENT)
Dept: FAMILY MEDICINE CLINIC | Facility: CLINIC | Age: 42
End: 2022-10-25

## 2022-10-25 ENCOUNTER — LAB (OUTPATIENT)
Dept: LAB | Facility: HOSPITAL | Age: 42
End: 2022-10-25

## 2022-10-25 ENCOUNTER — TELEPHONE (OUTPATIENT)
Dept: FAMILY MEDICINE CLINIC | Facility: CLINIC | Age: 42
End: 2022-10-25

## 2022-10-25 VITALS
OXYGEN SATURATION: 100 % | WEIGHT: 257.6 LBS | DIASTOLIC BLOOD PRESSURE: 80 MMHG | HEART RATE: 86 BPM | SYSTOLIC BLOOD PRESSURE: 118 MMHG | BODY MASS INDEX: 39.18 KG/M2 | TEMPERATURE: 97.8 F

## 2022-10-25 DIAGNOSIS — R73.03 PREDIABETES: ICD-10-CM

## 2022-10-25 DIAGNOSIS — E78.5 HYPERLIPIDEMIA, UNSPECIFIED HYPERLIPIDEMIA TYPE: ICD-10-CM

## 2022-10-25 DIAGNOSIS — E03.8 SUBCLINICAL HYPOTHYROIDISM: ICD-10-CM

## 2022-10-25 DIAGNOSIS — M25.562 PATELLOFEMORAL ARTHRALGIA OF LEFT KNEE: Primary | ICD-10-CM

## 2022-10-25 DIAGNOSIS — M25.50 POLYARTHRALGIA: ICD-10-CM

## 2022-10-25 PROCEDURE — 80053 COMPREHEN METABOLIC PANEL: CPT

## 2022-10-25 PROCEDURE — 84443 ASSAY THYROID STIM HORMONE: CPT

## 2022-10-25 PROCEDURE — 83036 HEMOGLOBIN GLYCOSYLATED A1C: CPT

## 2022-10-25 PROCEDURE — 84439 ASSAY OF FREE THYROXINE: CPT

## 2022-10-25 PROCEDURE — 99214 OFFICE O/P EST MOD 30 MIN: CPT | Performed by: FAMILY MEDICINE

## 2022-10-25 PROCEDURE — 80061 LIPID PANEL: CPT

## 2022-10-25 RX ORDER — GABAPENTIN 300 MG/1
300 CAPSULE ORAL 2 TIMES DAILY
Qty: 180 CAPSULE | Refills: 1 | Status: SHIPPED | OUTPATIENT
Start: 2022-10-25 | End: 2022-10-25 | Stop reason: DRUGHIGH

## 2022-10-25 RX ORDER — GABAPENTIN 300 MG/1
CAPSULE ORAL
Qty: 270 CAPSULE | Refills: 1 | Status: SHIPPED | OUTPATIENT
Start: 2022-10-25 | End: 2023-02-20 | Stop reason: SDUPTHER

## 2022-10-25 NOTE — TELEPHONE ENCOUNTER
Caller: Joselyn Shanae Stuart    Relationship: Self    Best call back number: [phone]    What medications are you currently taking:   Current Outpatient Medications on File Prior to Visit   Medication Sig Dispense Refill   • acyclovir (ZOVIRAX) 800 MG tablet take 1 tablet by mouth daily FOR COLD SORE PREVENTION  0   • albuterol sulfate  (90 Base) MCG/ACT inhaler Inhale 2 puffs Every 6 (Six) Hours As Needed for Wheezing. 8.5 g 1   • atorvastatin (LIPITOR) 20 MG tablet Take 1 tablet by mouth Daily. 90 tablet 1   • buPROPion SR (WELLBUTRIN SR) 150 MG 12 hr tablet Take 1 tablet by mouth Every Night. 270 tablet 1   • diclofenac (VOLTAREN) 50 MG EC tablet Take 1 tablet by mouth 2 (Two) Times a Day As Needed (pain). 60 tablet 1   • DULoxetine (CYMBALTA) 60 MG capsule Take 1 capsule by mouth Daily. 90 capsule 1   • gabapentin (NEURONTIN) 300 MG capsule Take 2 capsules by mouth Every Morning AND 1 capsule Every Night. 270 capsule 1   • levothyroxine (SYNTHROID, LEVOTHROID) 25 MCG tablet Take 1 tablet by mouth Daily. 30 tablet 0   • metFORMIN (GLUCOPHAGE) 500 MG tablet Take 1 tablet by mouth 2 (Two) Times a Day With Meals. 180 tablet 1   • phentermine (ADIPEX-P) 37.5 MG tablet      • rizatriptan (MAXALT) 5 MG tablet Take 1 tablet by mouth Daily As Needed for Migraine. May repeat in 2 hours if needed 9 tablet 2   • SUBOXONE 8-2 MG film film dissolve 2 FILMS under the tongue once daily  0   • [DISCONTINUED] gabapentin (NEURONTIN) 300 MG capsule Take 1 capsule by mouth 2 (Two) Times a Day. 180 capsule 1     No current facility-administered medications on file prior to visit.      When did you start taking these medications: HAS BEEN ON THE MEDICATION FOR A WHILE    Which medication are you concerned about: GABAPENTIN 300MG    Who prescribed you this medication: DR BARAHONA    What are your concerns: PATIENT STATES THAT THERE HAS BEEN A MIX UP AT THE PHARMACY AND [facility] HAS 2 PRESCRIPTIONS FOR THIS MEDICATION. THEY  NEED TO VERIFY WHICH IS THE CORRECT QUANTITY TO DISPATCH TO THE PATIENT SINCE DR BARAHONA ADJUSTED THE AMOUNT OF CAPSULES SHE TAKES AT HER APPOINTMENT TODAY 10/25/22     PLEASE CALL PATIENT WHEN THIS HAS BEEN CORRECTED.     How long have you had these concerns: 10/25/22

## 2022-10-26 ENCOUNTER — TELEPHONE (OUTPATIENT)
Dept: FAMILY MEDICINE CLINIC | Facility: CLINIC | Age: 42
End: 2022-10-26

## 2022-10-26 LAB
ALBUMIN SERPL-MCNC: 4.6 G/DL (ref 3.5–5.2)
ALBUMIN/GLOB SERPL: 1.8 G/DL
ALP SERPL-CCNC: 91 U/L (ref 39–117)
ALT SERPL W P-5'-P-CCNC: 15 U/L (ref 1–33)
ANION GAP SERPL CALCULATED.3IONS-SCNC: 12.9 MMOL/L (ref 5–15)
AST SERPL-CCNC: 29 U/L (ref 1–32)
BILIRUB SERPL-MCNC: 0.3 MG/DL (ref 0–1.2)
BUN SERPL-MCNC: 8 MG/DL (ref 6–20)
BUN/CREAT SERPL: 7.5 (ref 7–25)
CALCIUM SPEC-SCNC: 9.8 MG/DL (ref 8.6–10.5)
CHLORIDE SERPL-SCNC: 102 MMOL/L (ref 98–107)
CHOLEST SERPL-MCNC: 192 MG/DL (ref 0–200)
CO2 SERPL-SCNC: 25.1 MMOL/L (ref 22–29)
CREAT SERPL-MCNC: 1.06 MG/DL (ref 0.57–1)
EGFRCR SERPLBLD CKD-EPI 2021: 67 ML/MIN/1.73
GLOBULIN UR ELPH-MCNC: 2.6 GM/DL
GLUCOSE SERPL-MCNC: 82 MG/DL (ref 65–99)
HBA1C MFR BLD: 5.7 % (ref 4.8–5.6)
HDLC SERPL-MCNC: 44 MG/DL (ref 40–60)
LDLC SERPL CALC-MCNC: 120 MG/DL (ref 0–100)
LDLC/HDLC SERPL: 2.65 {RATIO}
POTASSIUM SERPL-SCNC: 5.1 MMOL/L (ref 3.5–5.2)
PROT SERPL-MCNC: 7.2 G/DL (ref 6–8.5)
SODIUM SERPL-SCNC: 140 MMOL/L (ref 136–145)
T4 FREE SERPL-MCNC: 1.02 NG/DL (ref 0.93–1.7)
TRIGL SERPL-MCNC: 157 MG/DL (ref 0–150)
TSH SERPL DL<=0.05 MIU/L-ACNC: 4.73 UIU/ML (ref 0.27–4.2)
VLDLC SERPL-MCNC: 28 MG/DL (ref 5–40)

## 2022-10-26 NOTE — TELEPHONE ENCOUNTER
Caller: Shanae Alves    Relationship: Self     Best call back number:    [phone]     Who are you requesting to speak with (clinical staff, provider,  specific staff member):  CLINICAL     What was the call regarding: PATIENT STATES SHE WENT TO  THE MEDICATION gabapentin (NEURONTIN) 300 MG capsule AT THE PHARMACY YESTERDAY   THEY WOULD NOT GIVE IT TO HER BECAUSE THEY HAD 2 SENT IN AND THEY DID NOT HAVE THE RIGHT DOSAGE CLARIFIED     PATIENT IS OUT OF THE MEDICATION     Do you require a callback:  PLEASE CALL

## 2022-11-10 RX ORDER — DULOXETIN HYDROCHLORIDE 60 MG/1
60 CAPSULE, DELAYED RELEASE ORAL DAILY
Qty: 90 CAPSULE | Refills: 1 | Status: SHIPPED | OUTPATIENT
Start: 2022-11-10

## 2022-11-10 NOTE — TELEPHONE ENCOUNTER
Rx Refill Note  Requested Prescriptions     Pending Prescriptions Disp Refills   • DULoxetine (CYMBALTA) 60 MG capsule 90 capsule 1     Sig: Take 1 capsule by mouth Daily.      Last office visit with prescribing clinician: 10/25/22  Next office visit with prescribing clinician: 1/25/23   {TIP  Encounters:23}         Rachel Rebolledo MA  11/10/22, 10:12 EST

## 2022-11-28 RX ORDER — ALBUTEROL SULFATE 90 UG/1
AEROSOL, METERED RESPIRATORY (INHALATION)
Qty: 8.5 G | Refills: 1 | Status: SHIPPED | OUTPATIENT
Start: 2022-11-28 | End: 2022-11-30 | Stop reason: SDUPTHER

## 2022-11-28 NOTE — TELEPHONE ENCOUNTER
Rx Refill Note  Requested Prescriptions     Pending Prescriptions Disp Refills   • albuterol sulfate  (90 Base) MCG/ACT inhaler [Pharmacy Med Name: ALBUTEROL HFA INH (200 PUFFS)8.5GM] 8.5 g 1     Sig: INHALE 2 PUFFS BY MOUTH EVERY 6 HOURS AS NEEDED FOR WHEEZING      Last office visit with prescribing clinician: 10/25/2022      Next office visit with prescribing clinician: 11/25/2023            Rachel Rebolledo MA  11/28/22, 16:22 EST

## 2022-11-29 DIAGNOSIS — M25.562 ACUTE PAIN OF LEFT KNEE: ICD-10-CM

## 2022-11-29 NOTE — TELEPHONE ENCOUNTER
Rx Refill Note  Requested Prescriptions     Pending Prescriptions Disp Refills   • diclofenac (VOLTAREN) 50 MG EC tablet 60 tablet 1     Sig: Take 1 tablet by mouth 2 (Two) Times a Day As Needed (pain).      Last office visit with prescribing clinician: 10/25/2022   Next office visit with prescribing clinician: 1/25/2023     Rachel Rebolledo MA  11/29/22, 14:34 EST

## 2022-11-30 RX ORDER — ALBUTEROL SULFATE 90 UG/1
2 AEROSOL, METERED RESPIRATORY (INHALATION) EVERY 6 HOURS PRN
Qty: 8.5 G | Refills: 1 | Status: SHIPPED | OUTPATIENT
Start: 2022-11-30 | End: 2023-03-06 | Stop reason: SDUPTHER

## 2023-02-20 DIAGNOSIS — M25.50 POLYARTHRALGIA: ICD-10-CM

## 2023-02-20 RX ORDER — GABAPENTIN 300 MG/1
CAPSULE ORAL
Qty: 270 CAPSULE | Refills: 1 | Status: SHIPPED | OUTPATIENT
Start: 2023-02-20

## 2023-02-20 NOTE — TELEPHONE ENCOUNTER
Rx Refill Note  Requested Prescriptions     Pending Prescriptions Disp Refills   • gabapentin (NEURONTIN) 300 MG capsule 270 capsule 1     Sig: Take 2 capsules by mouth Every Morning AND 1 capsule Every Night.      Last office visit with prescribing clinician: 10/25/2022   Last telemedicine visit with prescribing clinician: 3/6/2023   Next office visit with prescribing clinician: 3/6/2023        CDA 10/21/2021                 Would you like a call back once the refill request has been completed: [] Yes [] No    If the office needs to give you a call back, can they leave a voicemail: [] Yes [] No    Tejinder Garcia MA  02/20/23, 10:00 EST

## 2023-03-06 ENCOUNTER — LAB (OUTPATIENT)
Dept: LAB | Facility: HOSPITAL | Age: 43
End: 2023-03-06
Payer: COMMERCIAL

## 2023-03-06 ENCOUNTER — OFFICE VISIT (OUTPATIENT)
Dept: FAMILY MEDICINE CLINIC | Facility: CLINIC | Age: 43
End: 2023-03-06
Payer: COMMERCIAL

## 2023-03-06 VITALS
DIASTOLIC BLOOD PRESSURE: 68 MMHG | WEIGHT: 259.4 LBS | TEMPERATURE: 98.6 F | BODY MASS INDEX: 39.45 KG/M2 | OXYGEN SATURATION: 98 % | HEART RATE: 102 BPM | SYSTOLIC BLOOD PRESSURE: 118 MMHG

## 2023-03-06 DIAGNOSIS — Z01.419 WELL WOMAN EXAM: ICD-10-CM

## 2023-03-06 DIAGNOSIS — N92.6 MENSTRUAL CYCLE PROBLEM: ICD-10-CM

## 2023-03-06 DIAGNOSIS — M25.50 POLYARTHRALGIA: ICD-10-CM

## 2023-03-06 DIAGNOSIS — E03.8 SUBCLINICAL HYPOTHYROIDISM: ICD-10-CM

## 2023-03-06 DIAGNOSIS — M25.50 POLYARTHRALGIA: Primary | ICD-10-CM

## 2023-03-06 DIAGNOSIS — R60.0 PEDAL EDEMA: ICD-10-CM

## 2023-03-06 DIAGNOSIS — R53.83 OTHER FATIGUE: ICD-10-CM

## 2023-03-06 DIAGNOSIS — E66.01 CLASS 2 SEVERE OBESITY DUE TO EXCESS CALORIES WITH SERIOUS COMORBIDITY AND BODY MASS INDEX (BMI) OF 39.0 TO 39.9 IN ADULT: ICD-10-CM

## 2023-03-06 DIAGNOSIS — M25.562 ACUTE PAIN OF LEFT KNEE: ICD-10-CM

## 2023-03-06 DIAGNOSIS — F32.A DEPRESSION, UNSPECIFIED DEPRESSION TYPE: ICD-10-CM

## 2023-03-06 PROBLEM — E66.812 CLASS 2 SEVERE OBESITY DUE TO EXCESS CALORIES WITH SERIOUS COMORBIDITY AND BODY MASS INDEX (BMI) OF 39.0 TO 39.9 IN ADULT: Status: ACTIVE | Noted: 2023-03-06

## 2023-03-06 LAB
BASOPHILS # BLD AUTO: 0.08 10*3/MM3 (ref 0–0.2)
BASOPHILS NFR BLD AUTO: 1.1 % (ref 0–1.5)
CRP SERPL-MCNC: 1.19 MG/DL (ref 0.01–0.5)
DEPRECATED RDW RBC AUTO: 42.6 FL (ref 37–54)
EOSINOPHIL # BLD AUTO: 0.26 10*3/MM3 (ref 0–0.4)
EOSINOPHIL NFR BLD AUTO: 3.6 % (ref 0.3–6.2)
ERYTHROCYTE [DISTWIDTH] IN BLOOD BY AUTOMATED COUNT: 13.5 % (ref 12.3–15.4)
FSH SERPL-ACNC: 9.68 MIU/ML
HCT VFR BLD AUTO: 40.8 % (ref 34–46.6)
HGB BLD-MCNC: 13.8 G/DL (ref 12–15.9)
IMM GRANULOCYTES # BLD AUTO: 0.02 10*3/MM3 (ref 0–0.05)
IMM GRANULOCYTES NFR BLD AUTO: 0.3 % (ref 0–0.5)
LH SERPL-ACNC: 9.11 MIU/ML
LYMPHOCYTES # BLD AUTO: 2.32 10*3/MM3 (ref 0.7–3.1)
LYMPHOCYTES NFR BLD AUTO: 32.1 % (ref 19.6–45.3)
MCH RBC QN AUTO: 29.7 PG (ref 26.6–33)
MCHC RBC AUTO-ENTMCNC: 33.8 G/DL (ref 31.5–35.7)
MCV RBC AUTO: 87.7 FL (ref 79–97)
MONOCYTES # BLD AUTO: 0.74 10*3/MM3 (ref 0.1–0.9)
MONOCYTES NFR BLD AUTO: 10.2 % (ref 5–12)
NEUTROPHILS NFR BLD AUTO: 3.8 10*3/MM3 (ref 1.7–7)
NEUTROPHILS NFR BLD AUTO: 52.7 % (ref 42.7–76)
NRBC BLD AUTO-RTO: 0 /100 WBC (ref 0–0.2)
PLATELET # BLD AUTO: 395 10*3/MM3 (ref 140–450)
PMV BLD AUTO: 10.4 FL (ref 6–12)
RBC # BLD AUTO: 4.65 10*6/MM3 (ref 3.77–5.28)
T4 FREE SERPL-MCNC: 1.09 NG/DL (ref 0.93–1.7)
TESTOST SERPL-MCNC: 28.8 NG/DL (ref 8.4–48.1)
TSH SERPL DL<=0.05 MIU/L-ACNC: 5.42 UIU/ML (ref 0.27–4.2)
WBC NRBC COR # BLD: 7.22 10*3/MM3 (ref 3.4–10.8)

## 2023-03-06 PROCEDURE — 99214 OFFICE O/P EST MOD 30 MIN: CPT | Performed by: FAMILY MEDICINE

## 2023-03-06 PROCEDURE — 84439 ASSAY OF FREE THYROXINE: CPT

## 2023-03-06 PROCEDURE — 83001 ASSAY OF GONADOTROPIN (FSH): CPT

## 2023-03-06 PROCEDURE — 84403 ASSAY OF TOTAL TESTOSTERONE: CPT

## 2023-03-06 PROCEDURE — 86141 C-REACTIVE PROTEIN HS: CPT

## 2023-03-06 PROCEDURE — 85025 COMPLETE CBC W/AUTO DIFF WBC: CPT

## 2023-03-06 PROCEDURE — 86376 MICROSOMAL ANTIBODY EACH: CPT

## 2023-03-06 PROCEDURE — 84443 ASSAY THYROID STIM HORMONE: CPT

## 2023-03-06 PROCEDURE — 83002 ASSAY OF GONADOTROPIN (LH): CPT

## 2023-03-06 RX ORDER — ALBUTEROL SULFATE 90 UG/1
2 AEROSOL, METERED RESPIRATORY (INHALATION) EVERY 6 HOURS PRN
Qty: 8.5 G | Refills: 1 | Status: SHIPPED | OUTPATIENT
Start: 2023-03-06

## 2023-03-06 RX ORDER — BUPROPION HYDROCHLORIDE 150 MG/1
TABLET, EXTENDED RELEASE ORAL
Qty: 270 TABLET | Refills: 1 | Status: SHIPPED | OUTPATIENT
Start: 2023-03-06

## 2023-03-06 RX ORDER — SEMAGLUTIDE 0.25 MG/.5ML
0.25 INJECTION, SOLUTION SUBCUTANEOUS WEEKLY
Qty: 2 ML | Refills: 0 | Status: SHIPPED | OUTPATIENT
Start: 2023-03-06 | End: 2023-03-28

## 2023-03-06 RX ORDER — SEMAGLUTIDE 0.5 MG/.5ML
0.5 INJECTION, SOLUTION SUBCUTANEOUS WEEKLY
Qty: 2 ML | Refills: 0 | Status: SHIPPED | OUTPATIENT
Start: 2023-04-03 | End: 2023-04-25

## 2023-03-06 NOTE — ASSESSMENT & PLAN NOTE
Patient has a history of obesity with a BMI of Body mass index is 39.45 kg/m². she has been unsuccessful with a sustained trial of reduced calorie diet and increased physical activity, and has a clear indication for pharmacological intervention for weight management. she qualifies for Wegovy with a indication: BMI of 30 or greater.  · she has no family history of medullary thyroid carcinoma or multiple endocrine neoplasia.  · she is not at increased risk of pancreatitis or pancreatitis-associated complications.  · she has been counseled on the risks and benefits of treatment with Wegovy. she is an appropriate candidate for injectable medication for weight management.  · she will continue dosing at 0.25mg SC for 4 weeks; 0.5mg SC for 4 weeks; 1.0mg SC for 4 weeks; 1.7mg SC for 4 weeks; 2.4mg SC for 4 weeks.  · she will be seen in follow-up in 4 weeks to ensure tolerance of this medication.  · Thereafter, we will follow-up every 4 weeks for weight check, dietary diary review, and regular monitoring. We will monitor for tachycardia as well as depression and suicidal ideation.  If significant symptoms develop, we will plan to discontinue the medication.  · If the patient is unable to lose at least 4% of total body weight at 16 weeks, we will consider discontinuing the medication.

## 2023-03-06 NOTE — PROGRESS NOTES
Established Patient Office Visit      Patient Name: Shanae Alves  : 1980   MRN: [id number]   Care Team: Patient Care Team:  Junior Lujan DO as PCP - General (Family Medicine)    Chief Complaint:    Chief Complaint   Patient presents with   • Med Refill     Pt needs refills        History of Present Illness: Shanae Alves is a 42 y.o. female who is here today for chief complaint.    HPI    Starting a week before period and ending a week after she is having severe fatigue to the point of being debilitating, to the point of having to set alarms if she is sitting down to work so she doesn't fall asleep. No flow abnormalities, but menses are shorter 3-4 days instead of 6-7 when younger.    This patient is accompanied by their self who contributes to the history of their care.    The following portions of the patient's history were reviewed and updated as appropriate: allergies, current medications, past family history, past medical history, past social history, past surgical history and problem list.    Subjective      Review of Systems:   Review of Systems - See HPI    Past Medical History:   Past Medical History:   Diagnosis Date   • Arthritis    • Depression    • Headache    • Hyperlipidemia    • Hypothyroidism        Past Surgical History:   Past Surgical History:   Procedure Laterality Date   • CERVICAL CERCLAGE  2012       Family History:   Family History   Problem Relation Age of Onset   • Arthritis Mother    • Hyperlipidemia Mother    • Thyroid disease Mother    • Arthritis Father        Social History:   Social History     Socioeconomic History   • Marital status: Single   Tobacco Use   • Smoking status: Every Day     Packs/day: 1.00     Years: 20.00     Pack years: 20.00     Types: Cigarettes   • Smokeless tobacco: Never   Vaping Use   • Vaping Use: Never used   Substance and Sexual Activity   • Alcohol use: No   • Drug use: No   • Sexual activity: Defer       Tobacco History:    Social History     Tobacco Use   Smoking Status Every Day   • Packs/day: 1.00   • Years: 20.00   • Pack years: 20.00   • Types: Cigarettes   Smokeless Tobacco Never       Medications:     Current Outpatient Medications:   •  acyclovir (ZOVIRAX) 800 MG tablet, take 1 tablet by mouth daily FOR COLD SORE PREVENTION, Disp: , Rfl: 0  •  albuterol sulfate  (90 Base) MCG/ACT inhaler, Inhale 2 puffs Every 6 (Six) Hours As Needed for Wheezing., Disp: 8.5 g, Rfl: 1  •  atorvastatin (LIPITOR) 20 MG tablet, Take 1 tablet by mouth Daily., Disp: 90 tablet, Rfl: 1  •  diclofenac (VOLTAREN) 50 MG EC tablet, Take 1 tablet by mouth 2 (Two) Times a Day As Needed (pain)., Disp: 60 tablet, Rfl: 1  •  DULoxetine (CYMBALTA) 60 MG capsule, Take 1 capsule by mouth Daily., Disp: 90 capsule, Rfl: 1  •  gabapentin (NEURONTIN) 300 MG capsule, Take 2 capsules by mouth Every Morning AND 1 capsule Every Night., Disp: 270 capsule, Rfl: 1  •  levothyroxine (SYNTHROID, LEVOTHROID) 50 MCG tablet, Take 1 tablet by mouth Daily., Disp: 90 tablet, Rfl: 0  •  metFORMIN (GLUCOPHAGE) 500 MG tablet, Take 1 tablet by mouth 2 (Two) Times a Day With Meals., Disp: 180 tablet, Rfl: 1  •  rizatriptan (MAXALT) 5 MG tablet, Take 1 tablet by mouth Daily As Needed for Migraine. May repeat in 2 hours if needed, Disp: 9 tablet, Rfl: 2  •  SUBOXONE 8-2 MG film film, dissolve 2 FILMS under the tongue once daily, Disp: , Rfl: 0  •  buPROPion SR (WELLBUTRIN SR) 150 MG 12 hr tablet, TAKE 1 TABLET BY MOUTH EVERY EVENING, Disp: 270 tablet, Rfl: 1  •  phentermine (ADIPEX-P) 37.5 MG tablet, , Disp: , Rfl:   •  Wegovy 0.25 MG/0.5ML solution auto-injector, Inject 0.25 mg under the skin into the appropriate area as directed 1 (One) Time Per Week for 4 doses., Disp: 2 mL, Rfl: 0  •  [START ON 4/3/2023] Wegovy 0.5 MG/0.5ML solution auto-injector, Inject 0.5 mL under the skin into the appropriate area as directed 1 (One) Time Per Week for 4 doses., Disp: 2 mL, Rfl:  0    Allergies:   No Known Allergies    Objective   Objective     Physical Exam:  Vital Signs:   Vitals:    03/06/23 0932   BP: 118/68   BP Location: Left arm   Patient Position: Sitting   Cuff Size: Adult   Pulse: 102   Temp: 98.6 °F (37 °C)   TempSrc: Infrared   SpO2: 98%   Weight: 118 kg (259 lb 6.4 oz)     Body mass index is 39.45 kg/m².     Physical Exam  Nursing note reviewed  Const: NAD, A&Ox4, Pleasant, Cooperative  Eyes: EOMI, no conjunctivitis  ENT: No nasal discharge present, neck supple  Cardiac: Regular rate and rhythm, no cyanosis  Resp: Respiratory rate within normal limits, no increased work of breathing, no audible wheezing or retractions noted  GI: No distention or ascites  MSK: Motor and sensation grossly intact in bilateral upper extremities  Neurologic: CN II-XII grossly intact  Psych: Appropriate mood and behavior.  Skin: Warm, dry  Procedures/Radiology     Procedures  No radiology results for the last 7 days     Assessment & Plan   Assessment / Plan      Assessment/Plan:   Problems Addressed This Visit  Diagnoses and all orders for this visit:    1. Polyarthralgia (Primary)  -     Thyroid Peroxidase Antibody; Future  -     TSH; Future  -     T4, Free; Future    2. Acute pain of left knee  -     diclofenac (VOLTAREN) 50 MG EC tablet; Take 1 tablet by mouth 2 (Two) Times a Day As Needed (pain).  Dispense: 60 tablet; Refill: 1    3. Other fatigue  -     CBC & Differential; Future  -     Thyroid Peroxidase Antibody; Future  -     TSH; Future  -     T4, Free; Future  -     High Sensitivity CRP; Future    4. Menstrual cycle problem  -     High Sensitivity CRP; Future  -     Follicle stimulating hormone; Future  -     Luteinizing hormone; Future  -     Testosterone; Future    5. Well woman exam  -     Ambulatory Referral to Gynecology    6. Class 2 severe obesity due to excess calories with serious comorbidity and body mass index (BMI) of 39.0 to 39.9 in adult (HCC)  Assessment & Plan:  Patient has a  history of obesity with a BMI of Body mass index is 39.45 kg/m². she has been unsuccessful with a sustained trial of reduced calorie diet and increased physical activity, and has a clear indication for pharmacological intervention for weight management. she qualifies for Wegovy with a indication: BMI of 30 or greater.  · she has no family history of medullary thyroid carcinoma or multiple endocrine neoplasia.  · she is not at increased risk of pancreatitis or pancreatitis-associated complications.  · she has been counseled on the risks and benefits of treatment with Wegovy. she is an appropriate candidate for injectable medication for weight management.  · she will continue dosing at 0.25mg SC for 4 weeks; 0.5mg SC for 4 weeks; 1.0mg SC for 4 weeks; 1.7mg SC for 4 weeks; 2.4mg SC for 4 weeks.  · she will be seen in follow-up in 4 weeks to ensure tolerance of this medication.  · Thereafter, we will follow-up every 4 weeks for weight check, dietary diary review, and regular monitoring. We will monitor for tachycardia as well as depression and suicidal ideation.  If significant symptoms develop, we will plan to discontinue the medication.  · If the patient is unable to lose at least 4% of total body weight at 16 weeks, we will consider discontinuing the medication.    Orders:  -     Wegovy 0.5 MG/0.5ML solution auto-injector; Inject 0.5 mL under the skin into the appropriate area as directed 1 (One) Time Per Week for 4 doses.  Dispense: 2 mL; Refill: 0  -     Wegovy 0.25 MG/0.5ML solution auto-injector; Inject 0.25 mg under the skin into the appropriate area as directed 1 (One) Time Per Week for 4 doses.  Dispense: 2 mL; Refill: 0    7. Subclinical hypothyroidism  Assessment & Plan:  Becoming more symptomatic over the last 1 year despite L-thyroxine 25mcg daily. TSH up again, recommend increasing to 50mcg daily.    Orders:  -     levothyroxine (SYNTHROID, LEVOTHROID) 50 MCG tablet; Take 1 tablet by mouth Daily.   Dispense: 90 tablet; Refill: 0    8. Pedal edema  -     levothyroxine (SYNTHROID, LEVOTHROID) 50 MCG tablet; Take 1 tablet by mouth Daily.  Dispense: 90 tablet; Refill: 0    Other orders  -     albuterol sulfate  (90 Base) MCG/ACT inhaler; Inhale 2 puffs Every 6 (Six) Hours As Needed for Wheezing.  Dispense: 8.5 g; Refill: 1    Problem List Items Addressed This Visit        Endocrine and Metabolic    Subclinical hypothyroidism    Current Assessment & Plan     Becoming more symptomatic over the last 1 year despite L-thyroxine 25mcg daily. TSH up again, recommend increasing to 50mcg daily.         Relevant Medications    levothyroxine (SYNTHROID, LEVOTHROID) 50 MCG tablet    Class 2 severe obesity due to excess calories with serious comorbidity and body mass index (BMI) of 39.0 to 39.9 in adult (HCC)    Overview     Previously failed phentermine, metformin, Contrave         Current Assessment & Plan     Patient has a history of obesity with a BMI of Body mass index is 39.45 kg/m². she has been unsuccessful with a sustained trial of reduced calorie diet and increased physical activity, and has a clear indication for pharmacological intervention for weight management. she qualifies for Wegovy with a indication: BMI of 30 or greater.  · she has no family history of medullary thyroid carcinoma or multiple endocrine neoplasia.  · she is not at increased risk of pancreatitis or pancreatitis-associated complications.  · she has been counseled on the risks and benefits of treatment with Wegovy. she is an appropriate candidate for injectable medication for weight management.  · she will continue dosing at 0.25mg SC for 4 weeks; 0.5mg SC for 4 weeks; 1.0mg SC for 4 weeks; 1.7mg SC for 4 weeks; 2.4mg SC for 4 weeks.  · she will be seen in follow-up in 4 weeks to ensure tolerance of this medication.  · Thereafter, we will follow-up every 4 weeks for weight check, dietary diary review, and regular monitoring. We will  monitor for tachycardia as well as depression and suicidal ideation.  If significant symptoms develop, we will plan to discontinue the medication.  · If the patient is unable to lose at least 4% of total body weight at 16 weeks, we will consider discontinuing the medication.         Relevant Medications    Wegovy 0.5 MG/0.5ML solution auto-injector (Start on 4/3/2023)    Wegovy 0.25 MG/0.5ML solution auto-injector   Other Visit Diagnoses     Polyarthralgia    -  Primary    Relevant Orders    Thyroid Peroxidase Antibody (Completed)    TSH (Completed)    T4, Free (Completed)    Acute pain of left knee        Relevant Medications    diclofenac (VOLTAREN) 50 MG EC tablet    Other fatigue        Relevant Orders    CBC & Differential (Completed)    Thyroid Peroxidase Antibody (Completed)    TSH (Completed)    T4, Free (Completed)    High Sensitivity CRP (Completed)    Menstrual cycle problem        Relevant Orders    High Sensitivity CRP (Completed)    Follicle stimulating hormone (Completed)    Luteinizing hormone (Completed)    Testosterone (Completed)    Well woman exam        Relevant Orders    Ambulatory Referral to Gynecology    Pedal edema        Relevant Medications    levothyroxine (SYNTHROID, LEVOTHROID) 50 MCG tablet          Patient Instructions   1. Follow up 6 weeks after starting Wegovy      Follow Up:   Return in about 6 weeks (around 4/17/2023) for wegovy.    DO KARLA Cadet RD  [facility]  [address]  [facility] [address]  Fax [phone]  Phone [phone]

## 2023-03-07 LAB — THYROPEROXIDASE AB SERPL-ACNC: 10 IU/ML (ref 0–34)

## 2023-03-07 RX ORDER — LEVOTHYROXINE SODIUM 0.05 MG/1
50 TABLET ORAL DAILY
Qty: 90 TABLET | Refills: 0 | Status: SHIPPED | OUTPATIENT
Start: 2023-03-07

## 2023-03-20 DIAGNOSIS — E78.5 HYPERLIPIDEMIA, UNSPECIFIED HYPERLIPIDEMIA TYPE: ICD-10-CM

## 2023-03-20 RX ORDER — ATORVASTATIN CALCIUM 20 MG/1
20 TABLET, FILM COATED ORAL DAILY
Qty: 90 TABLET | Refills: 1 | Status: SHIPPED | OUTPATIENT
Start: 2023-03-20

## 2023-03-20 NOTE — TELEPHONE ENCOUNTER
Rx Refill Note  Requested Prescriptions     Pending Prescriptions Disp Refills   • atorvastatin (LIPITOR) 20 MG tablet [Pharmacy Med Name: ATORVASTATIN 20MG TABLETS] 90 tablet 1     Sig: TAKE 1 TABLET BY MOUTH DAILY      Last office visit with prescribing clinician: 3/6/2023   Last telemedicine visit with prescribing clinician: 4/18/2023   Next office visit with prescribing clinician: 4/18/2023                         Would you like a call back once the refill request has been completed: [] Yes [] No    If the office needs to give you a call back, can they leave a voicemail: [] Yes [] No    Louise Lagos MA  03/20/23, 16:39 EDT

## 2023-04-25 DIAGNOSIS — R60.0 PEDAL EDEMA: ICD-10-CM

## 2023-04-25 DIAGNOSIS — E03.8 SUBCLINICAL HYPOTHYROIDISM: ICD-10-CM

## 2023-04-26 RX ORDER — LEVOTHYROXINE SODIUM 0.07 MG/1
75 TABLET ORAL DAILY
Qty: 90 TABLET | Refills: 0 | Status: SHIPPED | OUTPATIENT
Start: 2023-04-26

## 2023-04-26 NOTE — TELEPHONE ENCOUNTER
I would like her to increase her levothyroxine dose and we will repeat her labs at her follow up appointment

## 2023-05-04 RX ORDER — ALBUTEROL SULFATE 90 UG/1
2 AEROSOL, METERED RESPIRATORY (INHALATION) EVERY 6 HOURS PRN
Qty: 8.5 G | Refills: 1 | Status: SHIPPED | OUTPATIENT
Start: 2023-05-04

## 2023-05-24 DIAGNOSIS — M25.562 ACUTE PAIN OF LEFT KNEE: ICD-10-CM

## 2023-05-27 DIAGNOSIS — R73.03 PREDIABETES: ICD-10-CM

## 2023-06-12 ENCOUNTER — LAB (OUTPATIENT)
Dept: LAB | Facility: HOSPITAL | Age: 43
End: 2023-06-12
Payer: COMMERCIAL

## 2023-06-12 ENCOUNTER — OFFICE VISIT (OUTPATIENT)
Dept: FAMILY MEDICINE CLINIC | Facility: CLINIC | Age: 43
End: 2023-06-12
Payer: COMMERCIAL

## 2023-06-12 VITALS
BODY MASS INDEX: 39.68 KG/M2 | HEART RATE: 64 BPM | DIASTOLIC BLOOD PRESSURE: 92 MMHG | HEIGHT: 68 IN | SYSTOLIC BLOOD PRESSURE: 146 MMHG | WEIGHT: 261.8 LBS | TEMPERATURE: 96.3 F

## 2023-06-12 DIAGNOSIS — M25.562 PATELLOFEMORAL ARTHRALGIA OF LEFT KNEE: Primary | ICD-10-CM

## 2023-06-12 DIAGNOSIS — G89.29 CHRONIC KNEE PAIN, UNSPECIFIED LATERALITY: ICD-10-CM

## 2023-06-12 DIAGNOSIS — E03.8 SUBCLINICAL HYPOTHYROIDISM: ICD-10-CM

## 2023-06-12 DIAGNOSIS — R73.03 PREDIABETES: ICD-10-CM

## 2023-06-12 DIAGNOSIS — M25.569 CHRONIC KNEE PAIN, UNSPECIFIED LATERALITY: ICD-10-CM

## 2023-06-12 LAB
ANION GAP SERPL CALCULATED.3IONS-SCNC: 10.9 MMOL/L (ref 5–15)
BUN SERPL-MCNC: 8 MG/DL (ref 6–20)
BUN/CREAT SERPL: 8.3 (ref 7–25)
CALCIUM SPEC-SCNC: 10.2 MG/DL (ref 8.6–10.5)
CHLORIDE SERPL-SCNC: 103 MMOL/L (ref 98–107)
CO2 SERPL-SCNC: 26.1 MMOL/L (ref 22–29)
CREAT SERPL-MCNC: 0.96 MG/DL (ref 0.57–1)
EGFRCR SERPLBLD CKD-EPI 2021: 75.4 ML/MIN/1.73
GLUCOSE SERPL-MCNC: 87 MG/DL (ref 65–99)
HBA1C MFR BLD: 5.7 % (ref 4.8–5.6)
POTASSIUM SERPL-SCNC: 5 MMOL/L (ref 3.5–5.2)
SODIUM SERPL-SCNC: 140 MMOL/L (ref 136–145)
TSH SERPL DL<=0.05 MIU/L-ACNC: 2.79 UIU/ML (ref 0.27–4.2)

## 2023-06-12 PROCEDURE — 84443 ASSAY THYROID STIM HORMONE: CPT

## 2023-06-12 PROCEDURE — 80048 BASIC METABOLIC PNL TOTAL CA: CPT

## 2023-06-12 PROCEDURE — 83036 HEMOGLOBIN GLYCOSYLATED A1C: CPT

## 2023-06-12 RX ORDER — TRIAMCINOLONE ACETONIDE 40 MG/ML
40 INJECTION, SUSPENSION INTRA-ARTICULAR; INTRAMUSCULAR ONCE
Status: COMPLETED | OUTPATIENT
Start: 2023-06-12 | End: 2023-06-12

## 2023-06-12 RX ADMIN — TRIAMCINOLONE ACETONIDE 40 MG: 40 INJECTION, SUSPENSION INTRA-ARTICULAR; INTRAMUSCULAR at 14:00

## 2023-06-12 NOTE — PROGRESS NOTES
Subjective   Shanae Alves is a 42 y.o. female.     Chief Complaint   Patient presents with    Knee Pain     Pt co left knee pain wants injection       History of Present Illness     Shanae Alves presents today for   Chief Complaint   Patient presents with    Knee Pain     Pt co left knee pain wants injection       Left knee injection. Last done 9/15/22, tolerated very well.  She reports that she got about 8 months of relief out of her last injection and only recently started to feel some pain again.  She worked very hard at the physical therapy and exercises after her last injection.  Instructed to do so again today.    In April we increased her levothyroxine up to 75 mcg daily, in need of recheck today.    This patient is accompanied by their self who contributes to the history of their care.    The following portions of the patient's history were reviewed and updated as appropriate: allergies, current medications, past family history, past medical history, past social history, past surgical history and problem list.    Active Ambulatory Problems     Diagnosis Date Noted    Family history of hypothyroidism 11/13/2019    Personal history of tobacco use, presenting hazards to health 11/13/2019    Subclinical hypothyroidism 11/20/2019    Prediabetes 11/20/2019    Depression 01/14/2021    Class 2 severe obesity due to excess calories with serious comorbidity and body mass index (BMI) of 39.0 to 39.9 in adult 03/06/2023     Resolved Ambulatory Problems     Diagnosis Date Noted    No Resolved Ambulatory Problems     Past Medical History:   Diagnosis Date    Arthritis     Headache     Hyperlipidemia     Hypothyroidism      Past Surgical History:   Procedure Laterality Date    CERVICAL CERCLAGE  2012     Family History   Problem Relation Age of Onset    Arthritis Mother     Hyperlipidemia Mother     Thyroid disease Mother     Arthritis Father      Social History     Socioeconomic History    Marital status:  "Single   Tobacco Use    Smoking status: Every Day     Packs/day: 1.00     Years: 20.00     Pack years: 20.00     Types: Cigarettes    Smokeless tobacco: Never   Vaping Use    Vaping Use: Never used   Substance and Sexual Activity    Alcohol use: No    Drug use: No    Sexual activity: Defer       Review of Systems  Review of Systems -  General ROS: negative for - chills, fever or night sweats  MSK: see HPI  Skin: negative for recent or new-onset erythema or rash    Objective   Blood pressure 146/92, pulse 64, temperature 96.3 °F (35.7 °C), temperature source Infrared, height 172.7 cm (67.99\"), weight 119 kg (261 lb 12.8 oz), not currently breastfeeding.  Nursing note reviewed  Physical Exam  Const: NAD, A&Ox4, Pleasant, Cooperative  Skin: No overlying erythema  MSK:  Procedures  Procedure: Injection of the left knee joint  Prior to performance of the knee injection, a discussion of this procedure and alternative treatments was conducted with the patient.  Possible complications were discussed, and all questions were answered.  An informed consent was reviewed with the patient, and a signed copy will be scanned into the chart.     Anterolateral Peripatellar Approach  An anterolateral peripatellar approach was used.  The patient is seated at the edge of the exam table with the left knee flexed to 90° with the lower leg overhanging the edge.  The site was identified and marked below the inferior pole of the patella and 2 cm laterally.  The area was aseptically prepped.  The site was topically anesthetized and distracted using ethyl chloride. A 25-gauge 1-1/2 inch needle was then used to introduce an injectate consisting of 1 cc of 40 mg/mL triamcinolone and 2 cc of 1% lidocaine. Proper placement was confirmed via joint fluid aspiration into the syringe.     The needle was withdrawn.  No bleeding was encountered.  The injection site was cleaned and a dry sterile dressing was applied.     The patient tolerated the " procedure well without complications. she reported improvement of pain within 5 minutes.   Assessment & Plan   Problem List Items Addressed This Visit          Endocrine and Metabolic    Subclinical hypothyroidism    Current Assessment & Plan     Levothyroxine currently at 75 mcg daily since April.  Recheck TSH today.  Feeling slightly improved on the higher dosage.         Other Relevant Orders    TSH Rfx On Abnormal To Free T4    Prediabetes    Overview     A1c 6.11% November 2019         Current Assessment & Plan     Recheck A1c today         Relevant Orders    Hemoglobin A1c    Basic Metabolic Panel     Other Visit Diagnoses       Patellofemoral arthralgia of left knee    -  Primary    Relevant Medications    triamcinolone acetonide (KENALOG-40) injection 40 mg (Completed)    Chronic knee pain, unspecified laterality        Relevant Medications    triamcinolone acetonide (KENALOG-40) injection 40 mg (Completed)            Injection today, tolerated well. Imaging reviewed today at point of care together with patient. Post-injection instructions reviewed with patient. I would like to see her back in about 2-6 weeks to assess her progress and response to injection.     See patient diagnoses and orders along with patient instructions for assessment, plan, and changes to care for patient.    Patient Instructions   INSTRUCTIONS TO FOLLOW AFTER A CORTISONE INJECTION    1. The pain relieving medicine in the shot will wear off in the next 12-18 hours. After this, the pain at the site may return for a short time until the steroid takes effect 24-48 hours later.  2. Use ice tonight 30-60 minutes or longer to minimize swelling and discomfort that might follow the injection. (Either crushed ice in a plastic bag or crushed ice in an ice cap).  3. Begin heat tomorrow for at least one hour every day for one week. Place hot moist towels over the injection area, cover towel with plastic then apply a heating pad over the entire  area. You may use contrast therapy, with ice for 20 minutes immediately following the heat.  4. Relative rest should be undertaken. You may resume normal non-painful activities.     Return in about 6 weeks (around 7/24/2023) for follow-up after injection (Tai only).    Ambulatory progress note signed and attested to by Junior Lujan D.O.

## 2023-06-12 NOTE — ASSESSMENT & PLAN NOTE
Levothyroxine currently at 75 mcg daily since April.  Recheck TSH today.  Feeling slightly improved on the higher dosage.

## 2023-07-18 NOTE — ASSESSMENT & PLAN NOTE
Becoming more symptomatic over the last 1 year despite L-thyroxine 25mcg daily. TSH up again, recommend increasing to 50mcg daily.   # Dermatitis/erythroderma   - outside bx 4/2023 showing subacute spongiotic dermatitis  - punch biopsy at right thigh on 6/13/2023 suggestive of Psoriasis.  - S/p pred taper 60mg x 1 week, 40mg x 1 week, 20mg x 1 week (finished last dose of pred 20 yesterday)  - recommend continuing pred taper starting with 15mg PO daily (started today) for 5 days. If not flaring, can then go on to take 10mg PO daily for 5 days.  - prior flow cytometry- negative for lymphoma  - pt s/p one dose skyrizi administered July 11th in derm clinic  - CTA noted to have small lymphadenopathy, this was discussed with the patient's outpatient dermatologist Dr. Christianson. Recommend outpatient fu with Dr. Christianson.     The patient's chart was reviewed in addition to photos of the rash taken by the primary team with the permission of the patient.  Patient was seen at bedside  with the dermatology attending Dr. Rivera.   Recommendations were communicated with the primary team.  Please page [phone] for further related questions.    Krystina Varela MD  Resident Physician, PGY3  [facility]  Pager: [phone]

## 2023-07-25 DIAGNOSIS — E78.5 HYPERLIPIDEMIA, UNSPECIFIED HYPERLIPIDEMIA TYPE: ICD-10-CM

## 2023-07-25 RX ORDER — DULOXETIN HYDROCHLORIDE 60 MG/1
60 CAPSULE, DELAYED RELEASE ORAL DAILY
Qty: 90 CAPSULE | Refills: 1 | Status: SHIPPED | OUTPATIENT
Start: 2023-07-25

## 2023-07-25 RX ORDER — ATORVASTATIN CALCIUM 20 MG/1
20 TABLET, FILM COATED ORAL DAILY
Qty: 90 TABLET | Refills: 1 | OUTPATIENT
Start: 2023-07-25

## 2023-07-25 NOTE — TELEPHONE ENCOUNTER
Rx Refill Note  Requested Prescriptions     Pending Prescriptions Disp Refills    DULoxetine (CYMBALTA) 60 MG capsule 90 capsule 1     Sig: Take 1 capsule by mouth Daily.      Last office visit with prescribing clinician: 6/12/2023    Last telemedicine visit with prescribing clinician: Visit date not found   Next office visit with prescribing clinician: Visit date not found                         Would you like a call back once the refill request has been completed: [] Yes [] No    If the office needs to give you a call back, can they leave a voicemail: [] Yes [] No    Sandra Miramontes MA  07/25/23, 14:06 EDT

## 2023-07-26 DIAGNOSIS — E78.5 HYPERLIPIDEMIA, UNSPECIFIED HYPERLIPIDEMIA TYPE: ICD-10-CM

## 2023-07-26 NOTE — TELEPHONE ENCOUNTER
Rx Refill Note  Requested Prescriptions     Pending Prescriptions Disp Refills    atorvastatin (LIPITOR) 20 MG tablet 90 tablet 1     Sig: Take 1 tablet by mouth Daily.      Last office visit with prescribing clinician: 2023   Last telemedicine visit with prescribing clinician: Visit date not found   Next office visit with prescribing clinician: Visit date not found                         Would you like a call back once the refill request has been completed: [] Yes [] No    If the office needs to give you a call back, can they leave a voicemail: [] Yes [] No    April DINORAH Lagos  23, 13:31 EDT       She states that her medication has  as of  so she is calling to see iuf she can get refills. She is out.

## 2023-07-27 RX ORDER — ATORVASTATIN CALCIUM 20 MG/1
20 TABLET, FILM COATED ORAL DAILY
Qty: 90 TABLET | Refills: 1 | Status: SHIPPED | OUTPATIENT
Start: 2023-07-27

## 2023-08-11 DIAGNOSIS — M25.562 ACUTE PAIN OF LEFT KNEE: ICD-10-CM

## 2023-08-22 DIAGNOSIS — M25.50 POLYARTHRALGIA: ICD-10-CM

## 2023-08-23 DIAGNOSIS — M25.50 POLYARTHRALGIA: ICD-10-CM

## 2023-08-23 RX ORDER — GABAPENTIN 300 MG/1
CAPSULE ORAL
Qty: 270 CAPSULE | Refills: 1 | Status: SHIPPED | OUTPATIENT
Start: 2023-08-23

## 2023-08-23 RX ORDER — GABAPENTIN 300 MG/1
CAPSULE ORAL
Qty: 270 CAPSULE | Refills: 1 | OUTPATIENT
Start: 2023-08-23

## 2023-08-23 NOTE — TELEPHONE ENCOUNTER
Rx Refill Note  Requested Prescriptions     Pending Prescriptions Disp Refills    gabapentin (NEURONTIN) 300 MG capsule 270 capsule 1     Sig: Take 2 capsules by mouth Every Morning AND 1 capsule Every Night.      Last office visit with prescribing clinician: 6/12/2023   Last telemedicine visit with prescribing clinician: Visit date not found   Next office visit with prescribing clinician: Visit date not found                         Would you like a call back once the refill request has been completed: [] Yes [] No    If the office needs to give you a call back, can they leave a voicemail: [] Yes [] No    Candace Solis MA  08/23/23, 07:58 EDT

## 2023-10-06 DIAGNOSIS — R60.0 PEDAL EDEMA: ICD-10-CM

## 2023-10-06 DIAGNOSIS — E03.8 SUBCLINICAL HYPOTHYROIDISM: ICD-10-CM

## 2023-10-06 RX ORDER — LEVOTHYROXINE SODIUM 0.07 MG/1
75 TABLET ORAL DAILY
Qty: 90 TABLET | Refills: 0 | Status: SHIPPED | OUTPATIENT
Start: 2023-10-06

## 2023-10-06 RX ORDER — ALBUTEROL SULFATE 90 UG/1
2 AEROSOL, METERED RESPIRATORY (INHALATION) EVERY 6 HOURS PRN
Qty: 8.5 G | Refills: 1 | Status: SHIPPED | OUTPATIENT
Start: 2023-10-06

## 2023-10-06 NOTE — TELEPHONE ENCOUNTER
Rx Refill Note  Requested Prescriptions     Pending Prescriptions Disp Refills    levothyroxine (SYNTHROID, LEVOTHROID) 75 MCG tablet 90 tablet 0     Sig: Take 1 tablet by mouth Daily.    albuterol sulfate  (90 Base) MCG/ACT inhaler 8.5 g 1     Sig: Inhale 2 puffs Every 6 (Six) Hours As Needed for Wheezing.      Last office visit with prescribing clinician: 6/12/2023     Next office visit with prescribing clinician: Return in about 6 weeks (around 7/24/2023) for follow-up after injection (Tai only).      Rachel Rebolledo MA  10/06/23, 13:35 EDT

## 2023-11-27 RX ORDER — ALBUTEROL SULFATE 90 UG/1
2 AEROSOL, METERED RESPIRATORY (INHALATION) EVERY 6 HOURS PRN
Qty: 8.5 G | Refills: 1 | Status: SHIPPED | OUTPATIENT
Start: 2023-11-27

## 2023-12-18 DIAGNOSIS — E03.8 SUBCLINICAL HYPOTHYROIDISM: ICD-10-CM

## 2023-12-18 DIAGNOSIS — R60.0 PEDAL EDEMA: ICD-10-CM

## 2023-12-18 DIAGNOSIS — M25.50 POLYARTHRALGIA: ICD-10-CM

## 2023-12-18 DIAGNOSIS — F32.A DEPRESSION, UNSPECIFIED DEPRESSION TYPE: ICD-10-CM

## 2023-12-18 RX ORDER — BUPROPION HYDROCHLORIDE 150 MG/1
150 TABLET, EXTENDED RELEASE ORAL EVERY EVENING
Qty: 270 TABLET | Refills: 1 | Status: SHIPPED | OUTPATIENT
Start: 2023-12-18

## 2023-12-18 RX ORDER — ALBUTEROL SULFATE 90 UG/1
2 AEROSOL, METERED RESPIRATORY (INHALATION) EVERY 6 HOURS PRN
Qty: 8.5 G | Refills: 1 | Status: SHIPPED | OUTPATIENT
Start: 2023-12-18

## 2023-12-18 RX ORDER — LEVOTHYROXINE SODIUM 0.07 MG/1
75 TABLET ORAL DAILY
Qty: 90 TABLET | Refills: 0 | Status: SHIPPED | OUTPATIENT
Start: 2023-12-18

## 2023-12-18 NOTE — TELEPHONE ENCOUNTER
Rx Refill Note  Requested Prescriptions     Pending Prescriptions Disp Refills    gabapentin (NEURONTIN) 300 MG capsule 270 capsule 1     Sig: Take 2 capsules by mouth Every Morning AND 1 capsule Every Night.     Signed Prescriptions Disp Refills    buPROPion SR (WELLBUTRIN SR) 150 MG 12 hr tablet 270 tablet 1     Sig: Take 1 tablet by mouth Every Evening.     Authorizing Provider: BEATRIZ BARAHONA     Ordering User: ROBERT LILLY    levothyroxine (SYNTHROID, LEVOTHROID) 75 MCG tablet 90 tablet 0     Sig: Take 1 tablet by mouth Daily.     Authorizing Provider: BEATRIZ BARAHONA     Ordering User: ROBERT LILLY    albuterol sulfate  (90 Base) MCG/ACT inhaler 8.5 g 1     Sig: Inhale 2 puffs Every 6 (Six) Hours As Needed for Wheezing.     Authorizing Provider: BEATRIZ BARAHONA     Ordering User: ROBERT LILLY      Last office visit with prescribing clinician: 6/12/2023   Last telemedicine visit with prescribing clinician: Visit date not found   Next office visit with prescribing clinician: 1/18/2024                         Would you like a call back once the refill request has been completed: [] Yes [] No    If the office needs to give you a call back, can they leave a voicemail: [] Yes [] No    Robert Lilly MA  12/18/23, 13:58 EST

## 2023-12-19 RX ORDER — GABAPENTIN 300 MG/1
CAPSULE ORAL
Qty: 270 CAPSULE | Refills: 1 | Status: SHIPPED | OUTPATIENT
Start: 2023-12-19

## 2023-12-29 DIAGNOSIS — G43.829 MENSTRUAL MIGRAINE WITHOUT STATUS MIGRAINOSUS, NOT INTRACTABLE: ICD-10-CM

## 2023-12-29 RX ORDER — RIZATRIPTAN BENZOATE 5 MG/1
5 TABLET ORAL DAILY PRN
Qty: 9 TABLET | Refills: 2 | Status: SHIPPED | OUTPATIENT
Start: 2023-12-29

## 2024-01-05 RX ORDER — DULOXETIN HYDROCHLORIDE 60 MG/1
60 CAPSULE, DELAYED RELEASE ORAL DAILY
Qty: 90 CAPSULE | Refills: 1 | Status: SHIPPED | OUTPATIENT
Start: 2024-01-05

## 2024-01-08 RX ORDER — ALBUTEROL SULFATE 90 UG/1
2 AEROSOL, METERED RESPIRATORY (INHALATION) EVERY 6 HOURS PRN
Qty: 8.5 G | Refills: 1 | Status: SHIPPED | OUTPATIENT
Start: 2024-01-08

## 2024-01-26 ENCOUNTER — HOSPITAL ENCOUNTER (OUTPATIENT)
Dept: GENERAL RADIOLOGY | Facility: HOSPITAL | Age: 44
Discharge: HOME | End: 2024-01-26
Payer: COMMERCIAL

## 2024-01-26 ENCOUNTER — OFFICE VISIT (OUTPATIENT)
Dept: FAMILY MEDICINE CLINIC | Facility: CLINIC | Age: 44
End: 2024-01-26
Payer: COMMERCIAL

## 2024-01-26 ENCOUNTER — LAB (OUTPATIENT)
Dept: LAB | Facility: HOSPITAL | Age: 44
End: 2024-01-26
Payer: COMMERCIAL

## 2024-01-26 VITALS
SYSTOLIC BLOOD PRESSURE: 130 MMHG | HEART RATE: 75 BPM | HEIGHT: 68 IN | DIASTOLIC BLOOD PRESSURE: 86 MMHG | TEMPERATURE: 98.7 F | WEIGHT: 258 LBS | OXYGEN SATURATION: 97 % | BODY MASS INDEX: 39.1 KG/M2

## 2024-01-26 DIAGNOSIS — M79.671 RIGHT FOOT PAIN: ICD-10-CM

## 2024-01-26 DIAGNOSIS — G89.29 CHRONIC PAIN OF LEFT KNEE: Primary | ICD-10-CM

## 2024-01-26 DIAGNOSIS — N18.2 STAGE 2 CHRONIC KIDNEY DISEASE: ICD-10-CM

## 2024-01-26 DIAGNOSIS — M25.562 CHRONIC PAIN OF LEFT KNEE: Primary | ICD-10-CM

## 2024-01-26 PROCEDURE — 73630 X-RAY EXAM OF FOOT: CPT

## 2024-01-26 PROCEDURE — 84550 ASSAY OF BLOOD/URIC ACID: CPT

## 2024-01-26 PROCEDURE — 99214 OFFICE O/P EST MOD 30 MIN: CPT | Performed by: PHYSICIAN ASSISTANT

## 2024-01-26 PROCEDURE — 80048 BASIC METABOLIC PNL TOTAL CA: CPT

## 2024-01-26 NOTE — PATIENT INSTRUCTIONS
Tylenol arthritis strength 2 tablets twice daily as needed.   Try glucosamine and chondroitin supplement (osteobiflex) over the counter for 3 months.

## 2024-01-26 NOTE — PROGRESS NOTES
"    Chief Complaint   Patient presents with    Med Refill       HPI     Shanae Alves is a pleasant 43 y.o. female with a history of hypothyroidism, prediabetes, depression, and osteoarthritis who presents for evaluation of \"chief complaint.\"     Patient of Dr. Lujan. She was last seen in June last year. She is requesting a refill of diclofenac which she takes for chronic left knee pain. Treated with this medication for a couple of years. She takes it daily. In the past voltaren gel was not effective. Tylenol helps for a little bit. Took glucosamine and chondroitin about 10 years ago. Does not know if it helped her back then. Not sure she took it long enough. Prior injections with Dr. Lujan have been helpful.     Also has pain in her right foot x 6 months. She feels a knot on top of her foot. She thinks it may have started around the time she stepped in her cat's bowl. She also fell in the yard pushing a stake in the ground with her foot back in the summer. It was around the time her pain started. She denies a history of gout, prior foot problems.     Past Medical History:   Diagnosis Date    Arthritis     Depression     Headache     Hyperlipidemia     Hypothyroidism        Past Surgical History:   Procedure Laterality Date    CERVICAL CERCLAGE  2012       Family History   Problem Relation Age of Onset    Arthritis Mother     Hyperlipidemia Mother     Thyroid disease Mother     Arthritis Father        Social History     Socioeconomic History    Marital status: Single   Tobacco Use    Smoking status: Every Day     Packs/day: 1.00     Years: 20.00     Additional pack years: 0.00     Total pack years: 20.00     Types: Cigarettes    Smokeless tobacco: Never   Vaping Use    Vaping Use: Never used   Substance and Sexual Activity    Alcohol use: No    Drug use: No    Sexual activity: Defer       No Known Allergies    ROS    Review of Systems   Gastrointestinal:  Negative for abdominal pain and nausea. "   Musculoskeletal:  Positive for arthralgias.       Vitals:    01/26/24 1115   BP: 130/86   Pulse: 75   Temp: 98.7 °F (37.1 °C)   SpO2: 97%     Body mass index is 39.24 kg/m².      Current Outpatient Medications:     acyclovir (ZOVIRAX) 800 MG tablet, As Needed., Disp: , Rfl: 0    albuterol sulfate  (90 Base) MCG/ACT inhaler, INHALE 2 PUFFS BY MOUTH EVERY 6 HOURS AS NEEDED FOR WHEEZING, Disp: 8.5 g, Rfl: 1    atorvastatin (LIPITOR) 20 MG tablet, Take 1 tablet by mouth Daily., Disp: 90 tablet, Rfl: 1    buPROPion SR (WELLBUTRIN SR) 150 MG 12 hr tablet, Take 1 tablet by mouth Every Evening., Disp: 270 tablet, Rfl: 1    diclofenac (VOLTAREN) 50 MG EC tablet, Take 1 tablet by mouth 2 (Two) Times a Day As Needed (pain). for pain, Disp: 60 tablet, Rfl: 1    DULoxetine (CYMBALTA) 60 MG capsule, Take 1 capsule by mouth Daily., Disp: 90 capsule, Rfl: 1    gabapentin (NEURONTIN) 300 MG capsule, Take 2 capsules by mouth Every Morning AND 1 capsule Every Night., Disp: 270 capsule, Rfl: 1    levothyroxine (SYNTHROID, LEVOTHROID) 75 MCG tablet, Take 1 tablet by mouth Daily., Disp: 90 tablet, Rfl: 0    metFORMIN (GLUCOPHAGE) 500 MG tablet, TAKE 1 TABLET BY MOUTH TWICE DAILY WITH MEALS, Disp: 180 tablet, Rfl: 1    rizatriptan (MAXALT) 5 MG tablet, Take 1 tablet by mouth Daily As Needed for Migraine. May repeat in 2 hours if needed, Disp: 9 tablet, Rfl: 2    SUBOXONE 8-2 MG film film, dissolve 2 FILMS under the tongue once daily, Disp: , Rfl: 0    phentermine (ADIPEX-P) 37.5 MG tablet, , Disp: , Rfl:     PE    Physical Exam  Vitals reviewed.   Constitutional:       General: She is not in acute distress.  Pulmonary:      Effort: Pulmonary effort is normal. No respiratory distress.   Musculoskeletal:      Left knee: No swelling.        Feet:    Neurological:      Mental Status: She is alert.   Psychiatric:         Mood and Affect: Mood normal.          A/P    Problem List Items Addressed This Visit          Musculoskeletal and  Injuries    Chronic pain of left knee - Primary    Current Assessment & Plan     Reviewed left knee x-ray from 2022 showing mild tricompartmental OA, worse medial compartment.   Discussed patient's history of mild CKD and recommendation to avoid daily use of NSAIDS. Monitor bmp. Hold off on refilling diclofenac until we have her lab results back.   Will trial Tylenol arthritis strength as well as glucosamine and chondroitin. Patient is also interested in scheduling another injection with her PCP. This will be requested today.   RTC for annual physical with Dr. Lujan.           Other Visit Diagnoses       Right foot pain        Ddx: stress fracture, OA, gout, pseudogout, other.   Check foot x-ray, uric acid level. Tylenol for pain control at this time.    Relevant Orders    Basic Metabolic Panel (Completed)    Uric acid (Completed)    XR Foot 3+ View Right    Stage 2 chronic kidney disease                Plan of care was reviewed with patient at the conclusion of today's visit. Education was provided regarding diagnoses, management, prescribed or recommended OTC products, and the importance of compliance with follow-up appointments. The patient was counseled regarding the risks, benefits, and possible side-effects of treatment. I advised the patient to keep me informed of any acute changes in their status including new, worsening, or persistent symptoms. Patient expresses understanding and agreement with the management plan.        Favio Bonilla PA-C

## 2024-01-27 PROBLEM — M25.562 CHRONIC PAIN OF LEFT KNEE: Status: ACTIVE | Noted: 2024-01-27

## 2024-01-27 PROBLEM — G89.29 CHRONIC PAIN OF LEFT KNEE: Status: ACTIVE | Noted: 2024-01-27

## 2024-01-27 LAB
ANION GAP SERPL CALCULATED.3IONS-SCNC: 14 MMOL/L (ref 5–15)
BUN SERPL-MCNC: 7 MG/DL (ref 6–20)
BUN/CREAT SERPL: 6.8 (ref 7–25)
CALCIUM SPEC-SCNC: 9.7 MG/DL (ref 8.6–10.5)
CHLORIDE SERPL-SCNC: 99 MMOL/L (ref 98–107)
CO2 SERPL-SCNC: 24 MMOL/L (ref 22–29)
CREAT SERPL-MCNC: 1.03 MG/DL (ref 0.57–1)
EGFRCR SERPLBLD CKD-EPI 2021: 68.9 ML/MIN/1.73
GLUCOSE SERPL-MCNC: 77 MG/DL (ref 65–99)
POTASSIUM SERPL-SCNC: 4.6 MMOL/L (ref 3.5–5.2)
SODIUM SERPL-SCNC: 137 MMOL/L (ref 136–145)
URATE SERPL-MCNC: 5.7 MG/DL (ref 2.4–5.7)

## 2024-01-27 NOTE — ASSESSMENT & PLAN NOTE
Reviewed left knee x-ray from 2022 showing mild tricompartmental OA, worse medial compartment.   Discussed patient's history of mild CKD and recommendation to avoid daily use of NSAIDS. Monitor bmp. Hold off on refilling diclofenac until we have her lab results back.   Will trial Tylenol arthritis strength as well as glucosamine and chondroitin. Patient is also interested in scheduling another injection with her PCP. This will be requested today.   RTC for annual physical with Dr. Lujan.

## 2024-01-30 DIAGNOSIS — S92.414A CLOSED NONDISPLACED FRACTURE OF PROXIMAL PHALANX OF RIGHT GREAT TOE, INITIAL ENCOUNTER: ICD-10-CM

## 2024-01-30 DIAGNOSIS — M19.071 OSTEOARTHRITIS OF RIGHT FOOT, UNSPECIFIED OSTEOARTHRITIS TYPE: ICD-10-CM

## 2024-01-30 DIAGNOSIS — M79.671 RIGHT FOOT PAIN: Primary | ICD-10-CM

## 2024-02-29 DIAGNOSIS — R60.0 PEDAL EDEMA: ICD-10-CM

## 2024-02-29 DIAGNOSIS — E03.8 SUBCLINICAL HYPOTHYROIDISM: ICD-10-CM

## 2024-02-29 RX ORDER — LEVOTHYROXINE SODIUM 0.07 MG/1
75 TABLET ORAL DAILY
Qty: 90 TABLET | Refills: 0 | Status: SHIPPED | OUTPATIENT
Start: 2024-02-29

## 2024-02-29 RX ORDER — ALBUTEROL SULFATE 90 UG/1
2 AEROSOL, METERED RESPIRATORY (INHALATION) EVERY 6 HOURS PRN
Qty: 8.5 G | Refills: 1 | Status: SHIPPED | OUTPATIENT
Start: 2024-02-29

## 2024-02-29 RX ORDER — ALBUTEROL SULFATE 90 UG/1
2 AEROSOL, METERED RESPIRATORY (INHALATION) EVERY 6 HOURS PRN
Qty: 8.5 G | Refills: 1 | OUTPATIENT
Start: 2024-02-29

## 2024-04-11 ENCOUNTER — PROCEDURE VISIT (OUTPATIENT)
Dept: FAMILY MEDICINE CLINIC | Facility: CLINIC | Age: 44
End: 2024-04-11
Payer: COMMERCIAL

## 2024-04-11 VITALS
HEIGHT: 68 IN | SYSTOLIC BLOOD PRESSURE: 124 MMHG | DIASTOLIC BLOOD PRESSURE: 78 MMHG | BODY MASS INDEX: 38.24 KG/M2 | WEIGHT: 252.3 LBS

## 2024-04-11 DIAGNOSIS — M17.12 PRIMARY OSTEOARTHRITIS OF LEFT KNEE: ICD-10-CM

## 2024-04-11 DIAGNOSIS — T75.3XXA MOTION SICKNESS, INITIAL ENCOUNTER: Primary | ICD-10-CM

## 2024-04-11 DIAGNOSIS — G89.29 CHRONIC PAIN OF LEFT KNEE: ICD-10-CM

## 2024-04-11 DIAGNOSIS — M25.562 CHRONIC PAIN OF LEFT KNEE: ICD-10-CM

## 2024-04-11 RX ORDER — ONDANSETRON 8 MG/1
8 TABLET, ORALLY DISINTEGRATING ORAL EVERY 8 HOURS PRN
Qty: 9 TABLET | Refills: 2 | Status: SHIPPED | OUTPATIENT
Start: 2024-04-11 | End: 2024-04-11 | Stop reason: SDUPTHER

## 2024-04-11 RX ORDER — TRIAMCINOLONE ACETONIDE 40 MG/ML
40 INJECTION, SUSPENSION INTRA-ARTICULAR; INTRAMUSCULAR ONCE
Status: COMPLETED | OUTPATIENT
Start: 2024-04-11 | End: 2024-04-11

## 2024-04-11 RX ORDER — ONDANSETRON 8 MG/1
8 TABLET, ORALLY DISINTEGRATING ORAL EVERY 8 HOURS PRN
Qty: 9 TABLET | Refills: 2 | Status: SHIPPED | OUTPATIENT
Start: 2024-04-11

## 2024-04-11 RX ORDER — PROMETHAZINE HYDROCHLORIDE 12.5 MG/1
12.5 TABLET ORAL EVERY 6 HOURS PRN
Qty: 10 TABLET | Refills: 0 | Status: SHIPPED | OUTPATIENT
Start: 2024-04-11

## 2024-04-11 RX ORDER — PROMETHAZINE HYDROCHLORIDE 12.5 MG/1
12.5 TABLET ORAL EVERY 6 HOURS PRN
Qty: 10 TABLET | Refills: 0 | Status: SHIPPED | OUTPATIENT
Start: 2024-04-11 | End: 2024-04-11 | Stop reason: SDUPTHER

## 2024-04-11 RX ADMIN — TRIAMCINOLONE ACETONIDE 40 MG: 40 INJECTION, SUSPENSION INTRA-ARTICULAR; INTRAMUSCULAR at 16:07

## 2024-04-11 NOTE — PROGRESS NOTES
Subjective   Shanae Alves is a 43 y.o. female.     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 Last knee injection >1 year, did well.    This patient is accompanied by their self who contributes to the history of their care.    The following portions of the patient's history were reviewed and updated as appropriate: allergies, current medications, past family history, past medical history, past social history, past surgical history and problem list.    Active Ambulatory Problems     Diagnosis Date Noted    Family history of hypothyroidism 11/13/2019    Personal history of tobacco use, presenting hazards to health 11/13/2019    Subclinical hypothyroidism 11/20/2019    Prediabetes 11/20/2019    Depression 01/14/2021    Class 2 severe obesity due to excess calories with serious comorbidity and body mass index (BMI) of 39.0 to 39.9 in adult 03/06/2023    Chronic pain of left knee 01/27/2024     Resolved Ambulatory Problems     Diagnosis Date Noted    No Resolved Ambulatory Problems     Past Medical History:   Diagnosis Date    Arthritis     Headache     Hyperlipidemia     Hypothyroidism      Past Surgical History:   Procedure Laterality Date    CERVICAL CERCLAGE  2012     Family History   Problem Relation Age of Onset    Arthritis Mother     Hyperlipidemia Mother     Thyroid disease Mother     Arthritis Father      Social History     Socioeconomic History    Marital status: Single   Tobacco Use    Smoking status: Every Day     Current packs/day: 1.00     Average packs/day: 1 pack/day for 20.0 years (20.0 ttl pk-yrs)     Types: Cigarettes    Smokeless tobacco: Never   Vaping Use    Vaping status: Never Used   Substance and Sexual Activity    Alcohol use:  "No    Drug use: No    Sexual activity: Defer       Review of Systems  Review of Systems -  General ROS: negative for - chills, fever or night sweats  MSK: see HPI  Skin: negative for recent or new-onset erythema or rash    Objective   Blood pressure 124/78, height 172.7 cm (67.99\"), weight 114 kg (252 lb 4.8 oz), not currently breastfeeding.  Nursing note reviewed  Physical Exam  Const: NAD, A&Ox4, Pleasant, Cooperative  Skin: No overlying erythema  MSK:  Procedures  Procedure: Injection of the left knee joint  Prior to performance of the knee injection, a discussion of this procedure and alternative treatments was conducted with the patient.  Possible complications were discussed, and all questions were answered.  An informed consent was reviewed with the patient, and a signed copy will be scanned into the chart.     Anterolateral Peripatellar Approach  An anterolateral peripatellar approach was used.  The patient is seated at the edge of the exam table with the left knee flexed to 90° with the lower leg overhanging the edge.  The site was identified and marked below the inferior pole of the patella and 2 cm laterally.  The area was aseptically prepped.  The site was topically anesthetized and distracted using ethyl chloride. A 25-gauge 1-1/2 inch needle was then used to introduce an injectate consisting of 1 cc of 40 mg/mL triamcinolone and 2 cc of 1% lidocaine. Proper placement was confirmed via joint fluid aspiration into the syringe.     The needle was withdrawn.  No bleeding was encountered.  The injection site was cleaned and a dry sterile dressing was applied.     The patient tolerated the procedure well without complications. she reported complete relief of pain within 5 minutes.   Assessment & Plan   Problem List Items Addressed This Visit          Musculoskeletal and Injuries    Chronic pain of left knee    Relevant Medications    triamcinolone acetonide (KENALOG-40) injection 40 mg (Completed)     Other " Visit Diagnoses       Motion sickness, initial encounter    -  Primary    Relevant Medications    ondansetron ODT (ZOFRAN-ODT) 8 MG disintegrating tablet    promethazine (PHENERGAN) 12.5 MG tablet    Primary osteoarthritis of left knee        Relevant Medications    triamcinolone acetonide (KENALOG-40) injection 40 mg (Completed)          Has tried OTC meclizine without success. Would avoid scopolamine given anticholinergic burden of other meds. Zofran, phenergan limited # PRN    Injection today, tolerated well. Post-injection instructions reviewed with patient. I would like to see her back in about 2-6 weeks to assess her progress and response to injection.     See patient diagnoses and orders along with patient instructions for assessment, plan, and changes to care for patient.    Patient Instructions   INSTRUCTIONS TO FOLLOW AFTER A CORTISONE INJECTION    1. The pain relieving medicine in the shot will wear off in the next 12-18 hours. After this, the pain at the site may return for a short time until the steroid takes effect 24-48 hours later.  2. Use ice tonight 30-60 minutes or longer to minimize swelling and discomfort that might follow the injection. (Either crushed ice in a plastic bag or crushed ice in an ice cap).  3. Begin heat tomorrow for at least one hour every day for one week. Place hot moist towels over the injection area, cover towel with plastic then apply a heating pad over the entire area. You may use contrast therapy, with ice for 20 minutes immediately following the heat.  4. Relative rest should be undertaken. You may resume normal non-painful activities.     Return if symptoms worsen or fail to improve.    Ambulatory progress note signed and attested to by Junior Lujan D.O.

## 2024-04-22 DIAGNOSIS — M25.562 ACUTE PAIN OF LEFT KNEE: ICD-10-CM

## 2024-04-22 RX ORDER — ALBUTEROL SULFATE 90 UG/1
2 AEROSOL, METERED RESPIRATORY (INHALATION) EVERY 6 HOURS PRN
Qty: 8.5 G | Refills: 1 | Status: SHIPPED | OUTPATIENT
Start: 2024-04-22

## 2024-04-29 RX ORDER — ALBUTEROL SULFATE 90 UG/1
2 AEROSOL, METERED RESPIRATORY (INHALATION) EVERY 6 HOURS PRN
Qty: 8.5 G | Refills: 1 | OUTPATIENT
Start: 2024-04-29

## 2024-05-24 DIAGNOSIS — M25.50 POLYARTHRALGIA: ICD-10-CM

## 2024-05-29 RX ORDER — GABAPENTIN 300 MG/1
CAPSULE ORAL
Qty: 270 CAPSULE | Refills: 1 | Status: SHIPPED | OUTPATIENT
Start: 2024-05-29

## 2024-05-29 NOTE — TELEPHONE ENCOUNTER
Rx Refill Note  Requested Prescriptions     Pending Prescriptions Disp Refills    gabapentin (NEURONTIN) 300 MG capsule 270 capsule 1     Sig: Take 2 capsules by mouth Every Morning AND 1 capsule Every Night.      Last office visit with prescribing clinician: 6/12/2023   Last telemedicine visit with prescribing clinician: Visit date not found   Next office visit with prescribing clinician: Visit date not found                         Would you like a call back once the refill request has been completed: [] Yes [] No    If the office needs to give you a call back, can they leave a voicemail: [] Yes [] No    Candace Solis MA  05/29/24, 10:31 EDT

## 2024-06-14 DIAGNOSIS — R73.03 PREDIABETES: ICD-10-CM

## 2024-06-19 DIAGNOSIS — R60.0 PEDAL EDEMA: ICD-10-CM

## 2024-06-19 DIAGNOSIS — E03.8 SUBCLINICAL HYPOTHYROIDISM: ICD-10-CM

## 2024-06-20 RX ORDER — LEVOTHYROXINE SODIUM 0.07 MG/1
75 TABLET ORAL DAILY
Qty: 90 TABLET | Refills: 0 | Status: SHIPPED | OUTPATIENT
Start: 2024-06-20

## 2024-06-22 DIAGNOSIS — M25.50 POLYARTHRALGIA: ICD-10-CM

## 2024-06-24 RX ORDER — GABAPENTIN 300 MG/1
CAPSULE ORAL
Qty: 270 CAPSULE | Refills: 1 | Status: SHIPPED | OUTPATIENT
Start: 2024-06-24

## 2024-06-24 NOTE — TELEPHONE ENCOUNTER
Rx Refill Note  Requested Prescriptions     Pending Prescriptions Disp Refills    gabapentin (NEURONTIN) 300 MG capsule [Pharmacy Med Name: GABAPENTIN 300MG CAPSULES] 270 capsule      Sig: TAKE 2 CAPSULES BY MOUTH IN THE MORNING AND 1 CAPSULE EVERY NIGHT      Last office visit with prescribing clinician: 6/12/2023   Last telemedicine visit with prescribing clinician: Visit date not found   Next office visit with prescribing clinician: 8/16/2024       Beverley Miller MA  06/24/24, 11:15 EDT

## 2024-07-15 RX ORDER — ALBUTEROL SULFATE 90 UG/1
2 AEROSOL, METERED RESPIRATORY (INHALATION) EVERY 6 HOURS PRN
Qty: 8.5 G | Refills: 1 | Status: SHIPPED | OUTPATIENT
Start: 2024-07-15

## 2024-08-02 RX ORDER — DULOXETIN HYDROCHLORIDE 60 MG/1
60 CAPSULE, DELAYED RELEASE ORAL DAILY
Qty: 90 CAPSULE | Refills: 1 | Status: SHIPPED | OUTPATIENT
Start: 2024-08-02

## 2024-08-23 DIAGNOSIS — E78.5 HYPERLIPIDEMIA, UNSPECIFIED HYPERLIPIDEMIA TYPE: ICD-10-CM

## 2024-08-23 DIAGNOSIS — M25.562 ACUTE PAIN OF LEFT KNEE: ICD-10-CM

## 2024-08-23 RX ORDER — ATORVASTATIN CALCIUM 20 MG/1
20 TABLET, FILM COATED ORAL DAILY
Qty: 90 TABLET | Refills: 1 | Status: SHIPPED | OUTPATIENT
Start: 2024-08-23

## 2024-10-14 ENCOUNTER — OFFICE VISIT (OUTPATIENT)
Dept: FAMILY MEDICINE CLINIC | Facility: CLINIC | Age: 44
End: 2024-10-14
Payer: COMMERCIAL

## 2024-10-14 VITALS
HEIGHT: 68 IN | OXYGEN SATURATION: 98 % | SYSTOLIC BLOOD PRESSURE: 144 MMHG | HEART RATE: 103 BPM | DIASTOLIC BLOOD PRESSURE: 92 MMHG | BODY MASS INDEX: 38.37 KG/M2

## 2024-10-14 DIAGNOSIS — F41.9 ACUTE ANXIETY: ICD-10-CM

## 2024-10-14 DIAGNOSIS — F32.A DEPRESSION, UNSPECIFIED DEPRESSION TYPE: Primary | ICD-10-CM

## 2024-10-14 PROCEDURE — 99214 OFFICE O/P EST MOD 30 MIN: CPT | Performed by: FAMILY MEDICINE

## 2024-10-14 RX ORDER — DULOXETIN HYDROCHLORIDE 60 MG/1
60 CAPSULE, DELAYED RELEASE ORAL 2 TIMES DAILY
Qty: 180 CAPSULE | Refills: 1 | Status: SHIPPED | OUTPATIENT
Start: 2024-10-14

## 2024-10-14 RX ORDER — DULOXETIN HYDROCHLORIDE 60 MG/1
60 CAPSULE, DELAYED RELEASE ORAL 2 TIMES DAILY
Qty: 180 CAPSULE | Refills: 1 | Status: SHIPPED | OUTPATIENT
Start: 2024-10-14 | End: 2024-10-14 | Stop reason: SDUPTHER

## 2024-10-14 RX ORDER — HYDROXYZINE HYDROCHLORIDE 25 MG/1
25 TABLET, FILM COATED ORAL EVERY 8 HOURS PRN
Qty: 30 TABLET | Refills: 2 | Status: SHIPPED | OUTPATIENT
Start: 2024-10-14

## 2024-10-14 RX ORDER — HYDROXYZINE HYDROCHLORIDE 25 MG/1
25 TABLET, FILM COATED ORAL EVERY 8 HOURS PRN
Qty: 30 TABLET | Refills: 2 | Status: SHIPPED | OUTPATIENT
Start: 2024-10-14 | End: 2024-10-14 | Stop reason: SDUPTHER

## 2024-10-14 NOTE — PROGRESS NOTES
Established Patient Office Visit      Patient Name: Shanae Alves  : 1980   MRN: [id number]   Care Team: Patient Care Team:  Junior Lujan DO as PCP - General (Family Medicine)    Chief Complaint:    Chief Complaint   Patient presents with    Anxiety    Depression       History of Present Illness: Shanae Alves is a 44 y.o. female who is here today for chief complaint.    Anxiety  Presents for initial visit. Onset was 1 to 6 months ago. The problem is worse since onset. Symptoms include chest pain, depressed mood, excessive worry, insomnia, irritability, nausea, obsessions, palpitations and malaise/fatigue.  Patient reports no confusion, dizziness, dry mouth or shortness of breath. Symptoms occur constantly. The severity of symptoms is severe. The symptoms are aggravated by family issues, social activities and work stress. The patient sleeps 8 hours per night. The quality of sleep is fair. Her past medical history is significant for depression.   Additional comments: I found out two weeks ago my mother has stage 4 cancer    She takes Wellbutrin  mg twice daily.  She also has a history of hypothyroidism. Most pertinent she found out her 67 yo mother has pancreatic cancer spread to her liver.    This patient is accompanied by their self who contributes to the history of their care.    The following portions of the patient's history were reviewed and updated as appropriate: allergies, current medications, past family history, past medical history, past social history, past surgical history and problem list.    Subjective      Review of Systems:   Review of Systems   Constitutional:  Positive for irritability and malaise/fatigue.   Respiratory:  Negative for shortness of breath.    Cardiovascular:  Positive for chest pain and palpitations.   Gastrointestinal:  Positive for nausea.   Neurological:  Negative for dizziness and confusion.   Psychiatric/Behavioral:  Positive for depressed  mood. The patient has insomnia.     - See HPI    Past Medical History:   Past Medical History:   Diagnosis Date    Arthritis     Depression     Headache     Hyperlipidemia     Hypothyroidism        Past Surgical History:   Past Surgical History:   Procedure Laterality Date    CERVICAL CERCLAGE  2012       Family History:   Family History   Problem Relation Age of Onset    Arthritis Mother     Hyperlipidemia Mother     Thyroid disease Mother     Arthritis Father        Social History:   Social History     Socioeconomic History    Marital status: Single   Tobacco Use    Smoking status: Every Day     Current packs/day: 1.00     Average packs/day: 1 pack/day for 20.0 years (20.0 ttl pk-yrs)     Types: Cigarettes     Passive exposure: Never    Smokeless tobacco: Never   Vaping Use    Vaping status: Never Used   Substance and Sexual Activity    Alcohol use: No    Drug use: No    Sexual activity: Defer       Tobacco History:   Social History     Tobacco Use   Smoking Status Every Day    Current packs/day: 1.00    Average packs/day: 1 pack/day for 20.0 years (20.0 ttl pk-yrs)    Types: Cigarettes    Passive exposure: Never   Smokeless Tobacco Never       Medications:   Outpatient Medications Prior to Visit   Medication Sig Dispense Refill    acyclovir (ZOVIRAX) 800 MG tablet As Needed.  0    albuterol sulfate  (90 Base) MCG/ACT inhaler INHALE 2 PUFFS BY MOUTH EVERY 6 HOURS AS NEEDED FOR WHEEZING 8.5 g 1    atorvastatin (LIPITOR) 20 MG tablet TAKE 1 TABLET BY MOUTH DAILY 90 tablet 1    buPROPion SR (WELLBUTRIN SR) 150 MG 12 hr tablet Take 1 tablet by mouth Every Evening. 270 tablet 1    diclofenac (VOLTAREN) 50 MG EC tablet TAKE 1 TABLET BY MOUTH TWICE DAILY AS NEEDED FOR PAIN 90 tablet 1    gabapentin (NEURONTIN) 300 MG capsule TAKE 2 CAPSULES BY MOUTH IN THE MORNING AND 1 CAPSULE EVERY NIGHT 270 capsule 1    levothyroxine (SYNTHROID, LEVOTHROID) 75 MCG tablet Take 1 tablet by mouth Daily. 90 tablet 0    metFORMIN  "(GLUCOPHAGE) 500 MG tablet TAKE 1 TABLET BY MOUTH TWICE DAILY WITH MEALS 180 tablet 1    ondansetron ODT (ZOFRAN-ODT) 8 MG disintegrating tablet Place 1 tablet on the tongue Every 8 (Eight) Hours As Needed for Nausea or Vomiting. 9 tablet 2    promethazine (PHENERGAN) 12.5 MG tablet Take 1 tablet by mouth Every 6 (Six) Hours As Needed for Nausea or Vomiting. 10 tablet 0    rizatriptan (MAXALT) 5 MG tablet Take 1 tablet by mouth Daily As Needed for Migraine. May repeat in 2 hours if needed 9 tablet 2    SUBOXONE 8-2 MG film film dissolve 2 FILMS under the tongue once daily  0    DULoxetine (CYMBALTA) 60 MG capsule Take 1 capsule by mouth Daily. 90 capsule 1     No facility-administered medications prior to visit.        Allergies:   No Known Allergies    Objective   Objective     Physical Exam:  Vital Signs:   Vitals:    10/14/24 1011   BP: 144/92   Pulse: 103   SpO2: 98%   Height: 172.7 cm (67.99\")   PainSc: 0-No pain     Body mass index is 38.37 kg/m².     Physical Exam  Nursing note reviewed  Const: NAD, A&Ox4, Pleasant, Cooperative  Eyes: EOMI, no conjunctivitis  Psych: Tearful during interview  Procedures/Radiology     Procedures  No radiology results for the last 7 days     Assessment & Plan   Assessment / Plan      Assessment/Plan:   Problems Addressed This Visit  Diagnoses and all orders for this visit:    1. Depression, unspecified depression type (Primary)  -     Ambulatory Referral to Psychology    2. Acute anxiety  -     Discontinue: DULoxetine (CYMBALTA) 60 MG capsule; Take 1 capsule by mouth 2 (Two) Times a Day.  Dispense: 180 capsule; Refill: 1  -     Discontinue: hydrOXYzine (ATARAX) 25 MG tablet; Take 1 tablet by mouth Every 8 (Eight) Hours As Needed for Anxiety (sleep).  Dispense: 30 tablet; Refill: 2  -     DULoxetine (CYMBALTA) 60 MG capsule; Take 1 capsule by mouth 2 (Two) Times a Day.  Dispense: 180 capsule; Refill: 1  -     hydrOXYzine (ATARAX) 25 MG tablet; Take 1 tablet by mouth Every 8 " (Eight) Hours As Needed for Anxiety (sleep).  Dispense: 30 tablet; Refill: 2  -     Ambulatory Referral to Psychology      Problem List Items Addressed This Visit          Mental Health    Depression - Primary    Relevant Medications    DULoxetine (CYMBALTA) 60 MG capsule    hydrOXYzine (ATARAX) 25 MG tablet    Other Relevant Orders    Ambulatory Referral to Psychology     Other Visit Diagnoses       Acute anxiety        Relevant Medications    DULoxetine (CYMBALTA) 60 MG capsule    hydrOXYzine (ATARAX) 25 MG tablet    Other Relevant Orders    Ambulatory Referral to Psychology          Orders Placed This Encounter   Procedures    Ambulatory Referral to Psychology     Referral Priority:   Routine     Referral Type:   Behavorial Health/Psych     Referral Reason:   Specialty Services Required     Requested Specialty:   Psychology     Number of Visits Requested:   1     Obviously her acute anxiety is due to the new diagnosis for her mother and the stress accompanying that. She is particularly close with her mother, so she is having trouble with any routine functioning at work and around the home, very tearful and having trouble maintaining emotional posture.  -We will increase her Cymbalta to 60 mg twice daily  -Add hydroxyzine 25 mg 3 times daily as needed  -She is on Suboxone, so would be very careful with benzodiazepines, however this is a special situation and I would not rule them out entirely for very acute short-term limited use  -Referral to therapist: Acute anxiety due to stress (mother dx with pancreatic CA) with baseline depression, initial referral at patient request for talk/individualized therapy (not medication management)    Patient Instructions   Increase duloxetine to 60mg in AM and PM  New medication as-needed for anxiety and sleep    Follow Up:   Return in about 2 weeks (around 10/28/2024) for video visit.      DO KARLA Cadet RD  [facility]  PRIMARY CARE  [address] [address]  Fax [phone]  Phone [phone]    Disclaimer to patients: The 21st Century Cares Act makes medical notes like these available to patients in the interest of transparency. However, please be advised that this is still a medical document. It is intended as [id number] communication. Many sections may include medical language or jargon, abbreviations, and additional verbiage that are unfamiliar or confusing. In some ways it may come across as blunt, direct, or may be summarized in order to clearly and concisely communicate the most crucial information to medical professionals. It may also include mentions of conditions that are unlikely but considered as part of the differential diagnosis, including serious disorders. These are not always discussed at length at the time of appointment because their likelihood is so low, but may be included in a medical note to make it clear what has been considered and/or ruled out as part of a work-up. Medical documents are intended to carry relevant information, facts as evident, and the personal clinical opinion of the physician. If you have any questions regarding this medical document, please bring them to the attention of the physician at your next scheduled appointment.

## 2024-10-24 DIAGNOSIS — E03.8 SUBCLINICAL HYPOTHYROIDISM: ICD-10-CM

## 2024-10-24 DIAGNOSIS — R60.0 PEDAL EDEMA: ICD-10-CM

## 2024-10-24 RX ORDER — LEVOTHYROXINE SODIUM 75 UG/1
75 TABLET ORAL DAILY
Qty: 90 TABLET | Refills: 0 | Status: SHIPPED | OUTPATIENT
Start: 2024-10-24

## 2024-10-24 NOTE — TELEPHONE ENCOUNTER
Rx Refill Note  Requested Prescriptions     Pending Prescriptions Disp Refills    levothyroxine (SYNTHROID, LEVOTHROID) 75 MCG tablet 90 tablet 0     Sig: Take 1 tablet by mouth Daily.      Last office visit with prescribing clinician: 10/14/2024   Last telemedicine visit with prescribing clinician: Visit date not found   Next office visit with prescribing clinician: 10/28/2024                         Would you like a call back once the refill request has been completed: [] Yes [] No    If the office needs to give you a call back, can they leave a voicemail: [] Yes [] No    Any Delvalle MA  10/24/24, 13:21 EDT

## 2024-10-28 ENCOUNTER — TELEMEDICINE (OUTPATIENT)
Dept: FAMILY MEDICINE CLINIC | Facility: CLINIC | Age: 44
End: 2024-10-28
Payer: COMMERCIAL

## 2024-10-28 DIAGNOSIS — F32.A DEPRESSION, UNSPECIFIED DEPRESSION TYPE: Primary | ICD-10-CM

## 2024-10-28 DIAGNOSIS — F41.9 ACUTE ANXIETY: ICD-10-CM

## 2024-10-28 PROCEDURE — 99213 OFFICE O/P EST LOW 20 MIN: CPT | Performed by: FAMILY MEDICINE

## 2024-10-28 RX ORDER — DIAZEPAM 2 MG/1
2 TABLET ORAL EVERY 8 HOURS PRN
Qty: 30 TABLET | Refills: 0 | Status: SHIPPED | OUTPATIENT
Start: 2024-10-28 | End: 2024-11-08 | Stop reason: SDUPTHER

## 2024-10-28 NOTE — LETTER
November 13, 2024     [facility]  [address] [place] [address]    Patient: Shanae Kenwood   YOB: 1980   Date of Visit: 10/28/2024     Dear Shanae Alves:  [facility]   Attention Elda    This letter serves as confirmation of my awareness of patient's ongoing suboxone treatment in light of her acute diazepam prescription.    If you have questions, please do not hesitate to call me. I look forward to following Shanae along with you.         Sincerely,        Junior Lujan, DO

## 2024-10-28 NOTE — PROGRESS NOTES
Subjective   Shanae Alves is a 44 y.o. female.     Chief Complaint   Patient presents with    Follow-up     depression       History of Present Illness     Shanae Alves presents today for   Chief Complaint   Patient presents with    Follow-up     depression     At her last visit 2 weeks ago we increased her cymbalta to 60mg BID and added hydroxyzine TID. She is going through some severe stress and anxiety due to her mother's recent diagnosis of pancreatic cancer.    You have chosen to receive care through a telehealth visit.  Do you consent to use a video/audio connection for your medical care today? Yes    Patient location: [address] [facility] [place] [address]   Provider Location: [address] [place] [address]    The following portions of the patient's history were reviewed and updated as appropriate: allergies, current medications, past family history, past medical history, past social history, past surgical history and problem list.    Active Ambulatory Problems     Diagnosis Date Noted    Family history of hypothyroidism 11/13/2019    Personal history of tobacco use, presenting hazards to health 11/13/2019    Subclinical hypothyroidism 11/20/2019    Prediabetes 11/20/2019    Depression 01/14/2021    Class 2 severe obesity due to excess calories with serious comorbidity and body mass index (BMI) of 39.0 to 39.9 in adult 03/06/2023    Chronic pain of left knee 01/27/2024     Resolved Ambulatory Problems     Diagnosis Date Noted    No Resolved Ambulatory Problems     Past Medical History:   Diagnosis Date    Arthritis     Headache     Hyperlipidemia     Hypothyroidism      Past Surgical History:   Procedure Laterality Date    CERVICAL CERCLAGE  2012     Family History   Problem Relation Age of Onset    Arthritis Mother     Hyperlipidemia Mother     Thyroid disease Mother     Arthritis Father      Social History     Socioeconomic History    Marital status: Single   Tobacco Use     Smoking status: Every Day     Current packs/day: 1.00     Average packs/day: 1 pack/day for 20.0 years (20.0 ttl pk-yrs)     Types: Cigarettes     Passive exposure: Never    Smokeless tobacco: Never   Vaping Use    Vaping status: Never Used   Substance and Sexual Activity    Alcohol use: No    Drug use: No    Sexual activity: Defer       Review of Systems  Review of Systems -  General ROS: negative for - chills, fever or night sweats  Cardiovascular ROS: no chest pain or dyspnea on exertion  Gastrointestinal ROS: no abdominal pain, change in bowel habits, or black or bloody stools  Genito-Urinary ROS: no dysuria, trouble voiding, or hematuria    Objective   not currently breastfeeding.  Vitals obtained from patient if available  Physical Exam  Const: Non-toxic appearing, NAD, A&Ox4, Pleasant, Cooperative  Eyes: EOMI, no conjunctivitis  ENT: No copious nasal drainage noted  Cardiac: Regular rate by pulse  Resp: Respiratory rate observed to be within normal limits, no increased work of breathing observed, no audible wheezing or cough noted  Psych: Appropriate mood and behavior.  Procedures  Assessment & Plan   Problem List Items Addressed This Visit          Mental Health    Depression - Primary     Other Visit Diagnoses       Acute anxiety                See patient diagnoses and orders along with patient instructions for assessment, plan, and changes to care for patient.    This visit was conducted via telemedicine with live video and audio provided through [facility] at the point of care.    There are no Patient Instructions on file for this visit.    Return in about 2 weeks (around 11/11/2024) for video visit.    Ambulatory progress note signed and attested to by Junior Lujan D.O.

## 2024-11-08 DIAGNOSIS — M25.562 ACUTE PAIN OF LEFT KNEE: ICD-10-CM

## 2024-11-08 DIAGNOSIS — F32.A DEPRESSION, UNSPECIFIED DEPRESSION TYPE: ICD-10-CM

## 2024-11-08 DIAGNOSIS — M25.50 POLYARTHRALGIA: ICD-10-CM

## 2024-11-08 DIAGNOSIS — F41.9 ACUTE ANXIETY: ICD-10-CM

## 2024-11-08 RX ORDER — GABAPENTIN 300 MG/1
CAPSULE ORAL
Qty: 270 CAPSULE | Refills: 1 | Status: SHIPPED | OUTPATIENT
Start: 2024-11-08

## 2024-11-08 RX ORDER — DIAZEPAM 2 MG/1
2 TABLET ORAL EVERY 8 HOURS PRN
Qty: 30 TABLET | Refills: 0 | Status: SHIPPED | OUTPATIENT
Start: 2024-11-08

## 2024-11-14 DIAGNOSIS — M25.50 POLYARTHRALGIA: ICD-10-CM

## 2024-11-15 RX ORDER — GABAPENTIN 300 MG/1
CAPSULE ORAL
Qty: 270 CAPSULE | OUTPATIENT
Start: 2024-11-15

## 2024-11-22 DIAGNOSIS — R73.03 PREDIABETES: ICD-10-CM

## 2024-11-22 DIAGNOSIS — F41.9 ACUTE ANXIETY: ICD-10-CM

## 2024-11-22 DIAGNOSIS — G43.829 MENSTRUAL MIGRAINE WITHOUT STATUS MIGRAINOSUS, NOT INTRACTABLE: ICD-10-CM

## 2024-11-22 DIAGNOSIS — F32.A DEPRESSION, UNSPECIFIED DEPRESSION TYPE: ICD-10-CM

## 2024-11-22 RX ORDER — RIZATRIPTAN BENZOATE 5 MG/1
5 TABLET ORAL DAILY PRN
Qty: 9 TABLET | Refills: 2 | Status: SHIPPED | OUTPATIENT
Start: 2024-11-22

## 2024-11-22 RX ORDER — DIAZEPAM 2 MG/1
2 TABLET ORAL EVERY 8 HOURS PRN
Qty: 30 TABLET | Refills: 0 | Status: SHIPPED | OUTPATIENT
Start: 2024-11-22

## 2024-12-05 DIAGNOSIS — F41.9 ACUTE ANXIETY: ICD-10-CM

## 2024-12-05 DIAGNOSIS — F32.A DEPRESSION, UNSPECIFIED DEPRESSION TYPE: ICD-10-CM

## 2024-12-05 RX ORDER — DIAZEPAM 2 MG/1
2 TABLET ORAL EVERY 8 HOURS PRN
Qty: 30 TABLET | Refills: 0 | Status: SHIPPED | OUTPATIENT
Start: 2024-12-05

## 2024-12-05 NOTE — TELEPHONE ENCOUNTER
Rx Refill Note  Requested Prescriptions     Pending Prescriptions Disp Refills    diazePAM (Valium) 2 MG tablet 30 tablet 0     Sig: Take 1 tablet by mouth Every 8 (Eight) Hours As Needed for Anxiety.      Last office visit with prescribing clinician: 10/14/2024   Last telemedicine visit with prescribing clinician: 10/28/2024   Next office visit with prescribing clinician: Visit date not found                         Would you like a call back once the refill request has been completed: [] Yes [] No    If the office needs to give you a call back, can they leave a voicemail: [] Yes [] No    Candace Solis MA  12/05/24, 15:44 EST

## 2024-12-06 ENCOUNTER — TELEPHONE (OUTPATIENT)
Dept: FAMILY MEDICINE CLINIC | Facility: CLINIC | Age: 44
End: 2024-12-06

## 2024-12-06 NOTE — TELEPHONE ENCOUNTER
Caller: Shanae Alves    Relationship: Self    Best call back number:       [phone] (Mobile)     Which medication are you concerned about:       diazePAM (Valium) 2 MG tablet     Who prescribed you this medication:     DR BARAHONA    What are your concerns:     PATIENT STATED PHARMACY WILL NOT REFILL MEDICATION PRIOR TO RECEIVING CONTACT/CALL FROM DR BARAHONA STATING PATIENT ALSO TAKES MEDICATION LISTED BELOW      SUBOXONE 8-2 MG film      PATIENT STATED SHE IS LEAVING FOR [place] AS SOON AS SHE MAY  THE MEDICATION, DIAZEPAM - AS SOON AS POSSIBLE    PREFERRED PHARMACY:    [facility]    TELEPHONE CONTACT:    [phone]

## 2024-12-20 DIAGNOSIS — F41.9 ACUTE ANXIETY: ICD-10-CM

## 2024-12-20 DIAGNOSIS — F32.A DEPRESSION, UNSPECIFIED DEPRESSION TYPE: ICD-10-CM

## 2024-12-23 RX ORDER — DIAZEPAM 2 MG/1
2 TABLET ORAL EVERY 8 HOURS PRN
Qty: 30 TABLET | Refills: 0 | Status: SHIPPED | OUTPATIENT
Start: 2024-12-23

## 2025-01-03 DIAGNOSIS — F32.A DEPRESSION, UNSPECIFIED DEPRESSION TYPE: ICD-10-CM

## 2025-01-03 DIAGNOSIS — F41.9 ACUTE ANXIETY: ICD-10-CM

## 2025-01-03 RX ORDER — DIAZEPAM 2 MG/1
2 TABLET ORAL EVERY 8 HOURS PRN
Qty: 30 TABLET | Refills: 0 | Status: SHIPPED | OUTPATIENT
Start: 2025-01-03

## 2025-01-03 NOTE — TELEPHONE ENCOUNTER
Rx Refill Note  Requested Prescriptions     Pending Prescriptions Disp Refills    diazePAM (VALIUM) 2 MG tablet 30 tablet 0     Sig: Take 1 tablet by mouth Every 8 (Eight) Hours As Needed. for anxiety      Last office visit with prescribing clinician: 10/14/2024   Last telemedicine visit with prescribing clinician: 10/28/2024   Next office visit with prescribing clinician: Visit date not found                         Would you like a call back once the refill request has been completed: [] Yes [] No    If the office needs to give you a call back, can they leave a voicemail: [] Yes [] No    Candace Solis MA  01/03/25, 14:56 EST

## 2025-01-15 DIAGNOSIS — F41.9 ACUTE ANXIETY: ICD-10-CM

## 2025-01-15 DIAGNOSIS — F32.A DEPRESSION, UNSPECIFIED DEPRESSION TYPE: ICD-10-CM

## 2025-01-16 RX ORDER — DIAZEPAM 2 MG/1
2 TABLET ORAL EVERY 8 HOURS PRN
Qty: 30 TABLET | Refills: 0 | Status: SHIPPED | OUTPATIENT
Start: 2025-01-16

## 2025-01-16 NOTE — TELEPHONE ENCOUNTER
Rx Refill Note  Requested Prescriptions     Pending Prescriptions Disp Refills    diazePAM (VALIUM) 2 MG tablet 30 tablet 0     Sig: Take 1 tablet by mouth Every 8 (Eight) Hours As Needed for Anxiety. for anxiety      Last office visit with prescribing clinician: 10/14/2024   Last telemedicine visit with prescribing clinician: 10/28/2024   Next office visit with prescribing clinician: Visit date not found                         Would you like a call back once the refill request has been completed: [] Yes [] No    If the office needs to give you a call back, can they leave a voicemail: [] Yes [] No    Candace Solis MA  01/16/25, 14:04 EST

## 2025-01-20 DIAGNOSIS — F32.A DEPRESSION, UNSPECIFIED DEPRESSION TYPE: ICD-10-CM

## 2025-01-20 RX ORDER — BUPROPION HYDROCHLORIDE 150 MG/1
150 TABLET, EXTENDED RELEASE ORAL EVERY EVENING
Qty: 270 TABLET | Refills: 1 | Status: SHIPPED | OUTPATIENT
Start: 2025-01-20

## 2025-01-20 NOTE — TELEPHONE ENCOUNTER
Rx Refill Note  Requested Prescriptions     Pending Prescriptions Disp Refills    buPROPion SR (WELLBUTRIN SR) 150 MG 12 hr tablet 270 tablet 1     Sig: Take 1 tablet by mouth Every Evening.      Last office visit with prescribing clinician: 10/14/2024   Last telemedicine visit with prescribing clinician: 10/28/2024   Next office visit with prescribing clinician: Visit date not found     Queta Kurtz MA  01/20/25, 12:48 EST

## 2025-01-29 DIAGNOSIS — F41.9 ACUTE ANXIETY: ICD-10-CM

## 2025-01-29 DIAGNOSIS — F32.A DEPRESSION, UNSPECIFIED DEPRESSION TYPE: ICD-10-CM

## 2025-01-30 RX ORDER — DIAZEPAM 2 MG/1
2 TABLET ORAL EVERY 8 HOURS PRN
Qty: 30 TABLET | Refills: 0 | Status: SHIPPED | OUTPATIENT
Start: 2025-01-30

## 2025-01-30 NOTE — TELEPHONE ENCOUNTER
Rx Refill Note  Requested Prescriptions     Pending Prescriptions Disp Refills    diazePAM (VALIUM) 2 MG tablet 30 tablet 0     Sig: Take 1 tablet by mouth Every 8 (Eight) Hours As Needed for Anxiety. for anxiety      Last office visit with prescribing clinician: 10/14/2024   Last telemedicine visit with prescribing clinician: 10/28/2024   Next office visit with prescribing clinician: Visit date not found                         Would you like a call back once the refill request has been completed: [] Yes [] No    If the office needs to give you a call back, can they leave a voicemail: [] Yes [] No    Candace Solis MA  01/30/25, 14:46 EST

## 2025-02-04 RX ORDER — ALBUTEROL SULFATE 90 UG/1
2 INHALANT RESPIRATORY (INHALATION) EVERY 6 HOURS PRN
Qty: 8.5 G | Refills: 1 | Status: SHIPPED | OUTPATIENT
Start: 2025-02-04

## 2025-02-13 DIAGNOSIS — F32.A DEPRESSION, UNSPECIFIED DEPRESSION TYPE: ICD-10-CM

## 2025-02-13 DIAGNOSIS — F41.9 ACUTE ANXIETY: ICD-10-CM

## 2025-02-13 RX ORDER — DIAZEPAM 2 MG/1
2 TABLET ORAL EVERY 12 HOURS PRN
Qty: 60 TABLET | Refills: 0 | Status: SHIPPED | OUTPATIENT
Start: 2025-02-13

## 2025-02-13 NOTE — TELEPHONE ENCOUNTER
Rx Refill Note  Requested Prescriptions     Pending Prescriptions Disp Refills    diazePAM (VALIUM) 2 MG tablet 30 tablet 0     Sig: Take 1 tablet by mouth Every 8 (Eight) Hours As Needed for Anxiety. for anxiety      Last office visit with prescribing clinician: 10/14/2024   Last telemedicine visit with prescribing clinician: 10/28/2024   Next office visit with prescribing clinician: Visit date not found                         Would you like a call back once the refill request has been completed: [] Yes [] No    If the office needs to give you a call back, can they leave a voicemail: [] Yes [] No    Candace Solis MA  02/13/25, 10:21 EST

## 2025-02-27 ENCOUNTER — OFFICE VISIT (OUTPATIENT)
Dept: PSYCHIATRY | Facility: CLINIC | Age: 45
End: 2025-02-27
Payer: COMMERCIAL

## 2025-02-27 DIAGNOSIS — F41.1 GENERALIZED ANXIETY DISORDER: ICD-10-CM

## 2025-02-27 DIAGNOSIS — F33.2 SEVERE EPISODE OF RECURRENT MAJOR DEPRESSIVE DISORDER, WITHOUT PSYCHOTIC FEATURES: Primary | ICD-10-CM

## 2025-02-27 NOTE — PROGRESS NOTES
INITIAL OUTPATIENT INTAKE ASSESSMENT:    Time In: 2:35 PM  Time Out: 3:31 PM    Name of PCP: Tai  Referral source: Junior Lujan MD    Patient ID: Shanae Alves is a 44 y.o. female is presenting to [facility] for a  intake/assessment with JESSE Pickett.    Chief Complaint:     ICD-10-CM ICD-9-CM   1. Severe episode of recurrent major depressive disorder, without psychotic features  F33.2 296.33   2. Generalized anxiety disorder  F41.1 300.02      Pt states in September her mother was diagnosed with cancer.  They are very close and pt is an only child.  Pt states this news has made her depression and anxiety worse.  Pt struggles to get out of the bed in the morning and does not want to do anything but sleep.  Pt states she still pushes herself to go to her daughters sporting events but isolates sitting around.  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  Pt reports being easily irritated which sometimes causes arguments with her 12 yr old daughter.  Pt reports SI without intent or planning.  Pt reports depression has lasted two weeks or longer before.  Patient adamantly and convincingly denies current suicidal or homicidal ideation or perceptual disturbance.  PCP prescribes psych meds.        History  Past Medical History:   Diagnosis Date    Arthritis     Depression     Headache     Hyperlipidemia     Hypothyroidism      Mental/Behavioral Health History  History of prior treatment or hospitalization: None    Past Surgical History:   Procedure Laterality Date    CERVICAL CERCLAGE  2012     Family Psychiatric History  None known    Social History     Tobacco Use    Smoking status: Every Day     Current packs/day: 1.00     Average packs/day: 1 pack/day for 20.0 years (20.0 ttl pk-yrs)     Types: Cigarettes     Passive exposure: Never    Smokeless tobacco: Never   Vaping Use    Vaping status: Never  Used   Substance Use Topics    Alcohol use: No    Drug use: No     Significant Life Events  Has patient been through or witnessed a divorce? Yes parents are .    Has patient experienced a death / loss of relationship? yes  Pt has not had a relationship with her father for ten years.  Pt did not have the best relationship with her step-mother.    Pt struggled with loss of grandparents due to being close to them growing up.    Has patient experienced a major accident or tragic events? no    Has patient experienced any other significant life events or trauma (such as verbal, physical, sexual abuse, neglect)? no    Developmental History  Pt reports being an only child and that she had a good childhood.  Pt grew up in [place].  She has a close relationship with her mother and a 12 yr old daughter.  Pts parents  when she was young.  Pt has not had a relationship with her father for ten years.        Family History   Problem Relation Age of Onset    Arthritis Mother     Hyperlipidemia Mother     Thyroid disease Mother     Arthritis Father      Family Biopsychosocial History/Interpersonal/Relational  Marital Status: single    Patient's current living situation: Lives with daughter.  Daughters father visits with her on occasion.      Support system: Mother, daughter, friends.      Difficulty getting along with peers: no    Difficulty making new friendships: yes    Difficulty maintaining friendships: no    Close with family members: yes, mother and daughter.  Holidays with extended family.      Religous: yes Hindu     Work History:  Highest level of education obtained: two years of college    Ever been active duty in the ? 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      Legal History  None    Leisure and Recreation  Reading, time with cats and daughter.   "    Strengths/Resources: \"dependant.\"      Past Medical History:  Past Medical History:   Diagnosis Date    Arthritis     Depression     Headache     Hyperlipidemia     Hypothyroidism        Past Surgical History:  Past Surgical History:   Procedure Laterality Date    CERVICAL CERCLAGE  2012       Physical Exam:   not currently breastfeeding. There is no height or weight on file to calculate BMI.     History of prior treatment or hospitalizations: During detox.  1x as a child.      Are there any significant health issues (current or past) that could be affecting mental health: Arthritis, weight, thyroid.        Allergy:   No Known Allergies     Current Medications:  See intake paperwork  Current Outpatient Medications   Medication Sig Dispense Refill    acyclovir (ZOVIRAX) 800 MG tablet As Needed.  0    albuterol sulfate  (90 Base) MCG/ACT inhaler INHALE 2 PUFFS BY MOUTH EVERY 6 HOURS AS NEEDED FOR WHEEZING 8.5 g 1    atorvastatin (LIPITOR) 20 MG tablet TAKE 1 TABLET BY MOUTH DAILY 90 tablet 1    Dextromethorphan-buPROPion ER (Auvelity)  MG tablet controlled-release Take one oral tablet every morning x 5 days, then 1 tab morning and afternoon 60 tablet 2    diazePAM (VALIUM) 2 MG tablet Take 1 tablet by mouth Every 12 (Twelve) Hours As Needed for Anxiety. for anxiety 60 tablet 0    diclofenac (VOLTAREN) 50 MG EC tablet Take 1 tablet by mouth 2 (Two) Times a Day As Needed (Pain). for pain 90 tablet 1    DULoxetine (CYMBALTA) 60 MG capsule Take 1 capsule by mouth 2 (Two) Times a Day. 180 capsule 1    gabapentin (NEURONTIN) 300 MG capsule Take 2 capsules by mouth Every Morning AND 1 capsule Every Night. 270 capsule 1    levothyroxine (SYNTHROID, LEVOTHROID) 75 MCG tablet Take 1 tablet by mouth Daily. 90 tablet 0    metFORMIN (GLUCOPHAGE) 500 MG tablet Take 1 tablet by mouth 2 (Two) Times a Day With Meals. 180 tablet 1    ondansetron ODT (ZOFRAN-ODT) 8 MG disintegrating tablet Place 1 tablet on the tongue " Every 8 (Eight) Hours As Needed for Nausea or Vomiting. 9 tablet 2    promethazine (PHENERGAN) 12.5 MG tablet Take 1 tablet by mouth Every 6 (Six) Hours As Needed for Nausea or Vomiting. 10 tablet 0    propranolol (INDERAL) 10 MG tablet Take 1 tablet by mouth 2 (Two) Times a Day As Needed (take 1-2 tabs up to twice daily as needed for anxiety). 60 tablet 1    rizatriptan (MAXALT) 5 MG tablet Take 1 tablet by mouth Daily As Needed for Migraine. May repeat in 2 hours if needed 9 tablet 2    SUBOXONE 8-2 MG film film dissolve 2 FILMS under the tongue once daily  0     No current facility-administered medications for this visit.       Problem List:  Patient Active Problem List   Diagnosis    Family history of hypothyroidism    Personal history of tobacco use, presenting hazards to health    Subclinical hypothyroidism    Prediabetes    Depression    Class 2 severe obesity due to excess calories with serious comorbidity and body mass index (BMI) of 39.0 to 39.9 in adult    Chronic pain of left knee       Abuse/Addiction - Chemical and Behavioral: Pt reports a hx of active addiction to opiates (pills) for 15-16 years.  Pt quit in 2011, detoxing with methadone at  following discovery of her pregnancy.  Pt has smoked cigarettes since 1994.         Substance Age Frequency Amount Method Last use   Nicotine        Alcohol        Marijuana        Benzo        Opiates        Cocaine        Meth        K2/Spice        MDMA        Fentanyl        Heroin        Inhalents        Suboxone        Synthetics/Other:              Patient answered yes  to experiencing two or more of the following problems related to substance use: using more than intended or over longer period than intended; difficulty quitting or cutting back use; spending a great deal of time obtaining, using, or recovering from using; craving or strong desire or urge to use;  work and/or school problems; financial problems; family problems; using in dangerous  situations; physical or mental health problems; relapse; feelings of guilt or remorse about use; times when used and/or drank alone; needing to use more in order to achieve the desired effect; illness or withdrawal when stopping or cutting back use; using to relieve or avoid getting ill or developing withdrawal symptoms; and black outs and/or memory issues when using.        RISK ASSESSMENT/CSSRS  1. Does patient have thoughts of suicide? Yes   2. Does patient have intent for suicide? no  3. Does patient have a current plan for suicide? no  4. History of suicide attempts: no  5. Family history of suicide or attempts: no  6. History of violent behaviors towards others or property: no  7. Access to firearms or weapons: no  8. History of sexual aggression toward others: no  9. Risk Taking/Impulsive Behavior (current or past);  Yes - substance use in the past     PHQ-Score Total:  PHQ-9 Total Score:  23    ELISEO-7 Score Total:  ELISEO-7 Score:  20      REVIEW OF SYSTEMS:  Review of Systems     Mental Status Exam: Mental Status Exam:   Hygiene:   good  Cooperation:  Cooperative  Eye Contact:  Good  Psychomotor Behavior:  Appropriate  Affect:  Full range  Speech:  Normal  Thought Progress:  Goal directed and Linear  Thought Content:  Mood congruent  Suicidal:  Suicidal Ideation  Homicidal:  None  Hallucinations:  None  Delusion:  None  Memory:  Intact  Orientation:  Person, Place, Time, and Situation  Reliability:  good  Insight:  Good  Judgement:  Good  Impulse Control:  Good    Impression/Formulation:  Patient appeared alert and oriented.  Patient is voluntarily requesting to begin outpatient therapy at [facility].  Patient is receptive to assistance with maintaining a stable lifestyle.  Patient presents with history of anxiety, depression and substance abuse.  Patient is agreeable to attend routine therapy sessions.  Patient expressed desire to maintain stability and participate in the  therapeutic process.        Assessment & Plan     Visit Diagnoses:    ICD-10-CM ICD-9-CM   1. Severe episode of recurrent major depressive disorder, without psychotic features  F33.2 296.33   2. Generalized anxiety disorder  F41.1 300.02       Functional Status: Severe impairment    Prognosis: Good with Ongoing Treatment     Treatment Plan: Patient will continue supportive psychotherapy efforts and medications as indicated. Clinic will obtain release of information for current treatment team for continuity of care as needed. Patient will adhere to medication regimen as prescribed and report any side effects. Patient will contact this office, call 911 or present to the nearest emergency room should suicidal or homicidal ideations occur.    SHORT-TERM GOALS: Shanae    Patient will be compliant with medication, and patient will have no significant medication related side effects.  Patient will be engaged in psychotherapy as indicated.  Patient will report subjective improvement of symptoms.     LONG-TERM GOALS: To stabilize mood and treat/improve subjective symptoms, the patient will stay out of the hospital, the patient will be at an optimal level of functioning, and the patient will take all medications as prescribed.The patient verbalized understanding and agreement with goals that were mutually set.  With the help of therapy, patient would like to: Pt would like to reduce symptom impact and a place to process stressors.    Crisis Plan:  Symptoms and/or behaviors to indicate a crisis: Excessive worry or fear, Feeling sad or low, Prolonged irritability or anger, Isolation, Abuse of substances, Thinking about suicide, and Self-doubt    What calming techniques or other strategies will patient use to de-escalate and stay safe: slow down, breathe, visualize calming self, think it though, listen to music, change focus, take a walk    Who is one person patient can contact to assist with de-escalation? Mother     If  symptoms/behaviors persist, patient will present to the nearest hospital for an assessment. Advised patient of [facility] 24/7 assessment services.       Recommended Referrals: None at this time    Return in about 2 weeks (around 3/13/2025) for Therapy session.       JESSE Pickett   [facility]     This document has been electronically signed by JESSE Pickett   March 12, 2025 08:42 EDT

## 2025-03-07 ENCOUNTER — TELEMEDICINE (OUTPATIENT)
Dept: PSYCHIATRY | Facility: CLINIC | Age: 45
End: 2025-03-07
Payer: COMMERCIAL

## 2025-03-07 ENCOUNTER — TELEPHONE (OUTPATIENT)
Dept: PSYCHIATRY | Facility: CLINIC | Age: 45
End: 2025-03-07

## 2025-03-07 DIAGNOSIS — F41.9 ACUTE ANXIETY: ICD-10-CM

## 2025-03-07 DIAGNOSIS — F33.2 MDD (MAJOR DEPRESSIVE DISORDER), RECURRENT SEVERE, WITHOUT PSYCHOSIS: Primary | ICD-10-CM

## 2025-03-07 DIAGNOSIS — F11.20 OPIOID USE DISORDER, SEVERE, ON MAINTENANCE THERAPY: ICD-10-CM

## 2025-03-07 DIAGNOSIS — F41.0 PANIC DISORDER: ICD-10-CM

## 2025-03-07 DIAGNOSIS — F43.22 ADJUSTMENT DISORDER WITH ANXIOUS MOOD: ICD-10-CM

## 2025-03-07 RX ORDER — DEXTROMETHORPHAN HYDROBROMIDE, BUPROPION HYDROCHLORIDE 105; 45 MG/1; MG/1
TABLET, MULTILAYER, EXTENDED RELEASE ORAL
Qty: 60 TABLET | Refills: 2 | Status: SHIPPED | OUTPATIENT
Start: 2025-03-07

## 2025-03-07 RX ORDER — DIAZEPAM 2 MG/1
2 TABLET ORAL EVERY 12 HOURS PRN
Qty: 60 TABLET | Refills: 0 | Status: SHIPPED | OUTPATIENT
Start: 2025-03-07

## 2025-03-07 RX ORDER — PROPRANOLOL HYDROCHLORIDE 10 MG/1
10 TABLET ORAL 2 TIMES DAILY PRN
Qty: 60 TABLET | Refills: 1 | Status: SHIPPED | OUTPATIENT
Start: 2025-03-07

## 2025-03-07 NOTE — TELEPHONE ENCOUNTER
Patient left a voicemail on  line that she said the copay for her meds was too much and wanted a call back.    Called Patient back to ask if she had a copay card. They have a copay card for the Auvelity. Pharmacy has advised non-formulary requires a PA.    PA determination has came back: Your patient will pay 100% of a discounted price for this medication. Any amount the patient pays will not apply to their deductible or out-of-pocket expenses.    Called Patient no answer left a message that she would need to call back so we can provider copay card information to see if this helps with cost.

## 2025-03-07 NOTE — PROGRESS NOTES
New Patient Office Visit      Patient Name: Shanae Alves  : 1980   MRN: [id number]     Referring Provider: Junior Lujan DO    Mode of Visit: Video  Location of patient: -HOME-  Location of provider: [facility]  You have chosen to receive care through a telehealth visit.  The patient has signed the video visit consent form.  The visit included audio and video interaction. No technical issues occurred during this visit.     Chief Complaint:      ICD-10-CM ICD-9-CM   1. MDD (major depressive disorder), recurrent severe, without psychosis  F33.2 296.33   2. Adjustment disorder with anxious mood  F43.22 309.24   3. Panic disorder  F41.0 300.01   4. Opioid use disorder, severe, on maintenance therapy  F11.20 304.00   5. Acute anxiety  F41.9 300.00        History of Present Illness:   Shanae Alves is a 44 y.o. female who is here today for medication management for anxiety and depression.  Patient states she has a history of anxiety and depression since her teenage years, however her mother, whom she is incredibly close to, was diagnosed with stage IV pancreatic cancer in September, and she has had a severe worsening of symptoms during this time.  She is currently prescribed duloxetine 60 mg twice daily, her primary care provider recently increased to the twice daily dosing, however she has noticed no difference whatsoever and she has went back to taking only once daily.  She is also prescribed bupropion  mg daily.  She has been on this combination for 7 to 8 years.  Recently she has developed panic episodes and was started on low-dose diazepam, initially 3 times daily but is now taking twice daily.  She states she has been taking diazepam for approximately 3 months.  Mood-patient ranks her mood as 8 out of 10 with 10 being the most severe she endorses poor concentration, low energy, low motivation, sleep disturbances, irritability, hopelessness, anhedonia.  She states that her  functioning at work has been impacted due to her mood and poor concentration  Anxiety-patient ranks anxiety 8 out of 10 with 10 being the most severe.  She endorses increased irritability, feeling tense/on edge.  She states she has developed panic episodes which she has not had previously.  These occur at night when going to bed she endorses her mind continually racing, ruminating she then will have episodes where she becomes tachypneic feels short of breath, tachycardic, will get overheated or develop hot flashes/sweats.  These episodes have greatly impacted her being able to initiate sleep.  Sleep-she states that on her work days she typically only sleeps about 5 hours and does have difficulty with initiation and interruption.  On her days off she endorses hypersomnia and states she will sleep anywhere from 10 to 15 hours.  This is also a new development during this episode.  She denies any decreased need for sleep  Appetite-patient states that since she had her child she has gained approximately 150 pounds, she endorses poor eating habits due to hectic lifestyle including eating late and consuming fast food 3-4 times per week.  She denies any stress eating and she denies any intentional restriction, binging, purging.  Patient denies any suicidal ideation, homicidal ideation, AVH      Subjective     Past Medical History:   Past Medical History:   Diagnosis Date    Arthritis     Depression     Headache     Hyperlipidemia     Hypothyroidism        Past Surgical History:   Past Surgical History:   Procedure Laterality Date    CERVICAL CERCLAGE  2012       Family History:   Family History   Problem Relation Age of Onset    Arthritis Mother     Hyperlipidemia Mother     Thyroid disease Mother     Arthritis Father        Family Psychiatric History:  Patient states there is no known family psychiatric history    Screening Scores:   PHQ-9 : 23  ELISEO-7 : 21    Therapy: Patient recently started therapy, had first session and  "is finding helpful.    Psychiatric History:   Previous medications tried: Zoloft, Paxil, Topamax, Lexapro  Inpatient admissions: Denies  History of suicide/self-harm attempts: Denies  Family history of suicide or attempts: No known suicide attempts or completions per patient report  Trauma/Abuse History: Patient denies any abuse or trauma at this time  Developmental History: Patient is an only child, parents  at age 8 she states she \"was kind of relieved\".  She stayed with her mother, has not had a relationship with her father for many years.  She states she moved with mother to a different county in high school, she states she did have some difficulties at first but was able to develop friendships.  Patient has never been  and has a 12-year-old daughter.  And is very close with her mother who lives an hour away    RISK ASSESSMENT:  Does patient currently have intent, plan, ideation for suicide?  No  Access to firearms or weapons: No  History of homicidal ideation: No  Risk Taking/Impulsive Behavior (current or past): No describe: None   Protective factors: Daughter, future orientation    Social History:  Highest level of education obtained: school  Living situation: Patient lives at home with her 12-year-old daughter  Patient's Occupation: Patient works for [employer] in the [employer]  Leisure and Recreation: Time with family  Support system: Patient has little support as she is a single mother and her daughter is involved in many activities that she is responsible for taking her to.  Illicit substance use: Patient has a history of opioid abuse.  She states she has been in recovery since 2012, she was in active addiction when she became pregnant with her daughter.  She then went through medical detox and had a healthy normal pregnancy.  She had some episodes of MAT and relapses but has been sober for several years while on Suboxone.  Alcohol use patient denies any alcohol use  Tobacco " use: Patient is a 1 pack/day smoker x 20 years    Legal History:   No legal issues.     Medications:     Current Outpatient Medications:     diazePAM (VALIUM) 2 MG tablet, Take 1 tablet by mouth Every 12 (Twelve) Hours As Needed for Anxiety. for anxiety, Disp: 60 tablet, Rfl: 0    acyclovir (ZOVIRAX) 800 MG tablet, As Needed., Disp: , Rfl: 0    albuterol sulfate  (90 Base) MCG/ACT inhaler, INHALE 2 PUFFS BY MOUTH EVERY 6 HOURS AS NEEDED FOR WHEEZING, Disp: 8.5 g, Rfl: 1    atorvastatin (LIPITOR) 20 MG tablet, TAKE 1 TABLET BY MOUTH DAILY, Disp: 90 tablet, Rfl: 1    Dextromethorphan-buPROPion ER (Auvelity)  MG tablet controlled-release, Take one oral tablet every morning x 5 days, then 1 tab morning and afternoon, Disp: 60 tablet, Rfl: 2    diclofenac (VOLTAREN) 50 MG EC tablet, Take 1 tablet by mouth 2 (Two) Times a Day As Needed (Pain). for pain, Disp: 90 tablet, Rfl: 1    DULoxetine (CYMBALTA) 60 MG capsule, Take 1 capsule by mouth 2 (Two) Times a Day., Disp: 180 capsule, Rfl: 1    gabapentin (NEURONTIN) 300 MG capsule, Take 2 capsules by mouth Every Morning AND 1 capsule Every Night., Disp: 270 capsule, Rfl: 1    levothyroxine (SYNTHROID, LEVOTHROID) 75 MCG tablet, Take 1 tablet by mouth Daily., Disp: 90 tablet, Rfl: 0    metFORMIN (GLUCOPHAGE) 500 MG tablet, Take 1 tablet by mouth 2 (Two) Times a Day With Meals., Disp: 180 tablet, Rfl: 1    ondansetron ODT (ZOFRAN-ODT) 8 MG disintegrating tablet, Place 1 tablet on the tongue Every 8 (Eight) Hours As Needed for Nausea or Vomiting., Disp: 9 tablet, Rfl: 2    promethazine (PHENERGAN) 12.5 MG tablet, Take 1 tablet by mouth Every 6 (Six) Hours As Needed for Nausea or Vomiting., Disp: 10 tablet, Rfl: 0    propranolol (INDERAL) 10 MG tablet, Take 1 tablet by mouth 2 (Two) Times a Day As Needed (take 1-2 tabs up to twice daily as needed for anxiety)., Disp: 60 tablet, Rfl: 1    rizatriptan (MAXALT) 5 MG tablet, Take 1 tablet by mouth Daily As Needed for  Migraine. May repeat in 2 hours if needed, Disp: 9 tablet, Rfl: 2    SUBOXONE 8-2 MG film film, dissolve 2 FILMS under the tongue once daily, Disp: , Rfl: 0    Medication Considerations:  ANNE/PDMP reviewed and appropriate in chart.     Allergies:   No Known Allergies    Objective     Physical Exam:  Vital Signs: There were no vitals filed for this visit.  There is no height or weight on file to calculate BMI.     Mental Status Exam:   MENTAL STATUS EXAM   General Appearance:  Cleanly groomed and dressed  Eye Contact:  Good eye contact  Attitude:  Cooperative  Motor Activity: Unable to assess due to video visit.  Muscle Strength: Unable to assess due to video visit.  Language:  Spontaneous  Mood and affect:  Depressed and anxious  Hopelessness:  6  Loneliness: 6  Thought Process:  Logical and goal-directed  Associations/ Thought Content:  No delusions  Hallucinations:  None  Suicidal Ideations:  Not present  Homicidal Ideation:  Not present  Sensorium:  Alert and clear  Orientation:  Person, place, time and situation  Immediate recall, recent, and remote memory: Not formally tested at this time.  Attention Span/ Concentration:  Poor  Fund of Knowledge:  Appropriate for age and educational level  Intellectual Functioning:  Average range  Insight:  Good  Judgement:  Good  Reliability:  Good  Impulse Control:  Good       Assessment / Plan      Visit Diagnosis/Orders Placed This Visit:  Diagnoses and all orders for this visit:    1. MDD (major depressive disorder), recurrent severe, without psychosis (Primary)  -     Dextromethorphan-buPROPion ER (Auvelity)  MG tablet controlled-release; Take one oral tablet every morning x 5 days, then 1 tab morning and afternoon  Dispense: 60 tablet; Refill: 2    2. Adjustment disorder with anxious mood  -     propranolol (INDERAL) 10 MG tablet; Take 1 tablet by mouth 2 (Two) Times a Day As Needed (take 1-2 tabs up to twice daily as needed for anxiety).  Dispense: 60 tablet;  Refill: 1    3. Panic disorder  -     propranolol (INDERAL) 10 MG tablet; Take 1 tablet by mouth 2 (Two) Times a Day As Needed (take 1-2 tabs up to twice daily as needed for anxiety).  Dispense: 60 tablet; Refill: 1    4. Opioid use disorder, severe, on maintenance therapy    5. Acute anxiety  -     diazePAM (VALIUM) 2 MG tablet; Take 1 tablet by mouth Every 12 (Twelve) Hours As Needed for Anxiety. for anxiety  Dispense: 60 tablet; Refill: 0         Functional Status: Moderate impairment     Prognosis: Fair with Ongoing Treatment     Impression/Formulation:  Patient appeared alert and oriented.  Patient is voluntarily requesting to begin psychiatric medication management at [facility].  Patient is receptive to assistance with maintaining a stable lifestyle.  Patient presents with history of     ICD-10-CM ICD-9-CM   1. MDD (major depressive disorder), recurrent severe, without psychosis  F33.2 296.33   2. Adjustment disorder with anxious mood  F43.22 309.24   3. Panic disorder  F41.0 300.01   4. Opioid use disorder, severe, on maintenance therapy  F11.20 304.00   5. Acute anxiety  F41.9 300.00   .     Care/Treatment Plan:   Initial visit with patient. No Care Plan or Treatment Plan initiated or created at this visit. However, we have discussed a temporary plan.   Ms. Alves has been on both duloxetine and bupropion for more than 7 years and is no longer finding it helpful with symptoms of depression.  She has multiple trials and failures of other antidepressants.  With her severe depression, poor functioning, and low motivation and low energy levels we discussed discontinuing the bupropion and starting auvelity.  Advised patient to take once daily for 5 to 7 days and then increase to the twice daily dosing.  She has already decreased her duloxetine back to once daily, as she found no improvement with the increased dosing.  We also discussed adding propranolol in the evenings for her frequent  panic episodes that include multiple physical symptoms.  We discussed at length the risk of long-term benzodiazepine use.  She was recently decreased from 3 times daily to twice daily, at this time we will maintain twice daily dosing while we are making medication adjustments.  I did inform Ms. Alves that we will continue to wean the diazepam and she is in complete agreement.  Ms. Alves is going to continue her therapy.  She has an appointment in about 2 to 3 weeks.  We will follow up in 4 weeks.  Discussed risk, benefits, side effects of all medications.  Patient verbalized understanding and agrees to plan of care.  Patient will continue supportive psychotherapy efforts and medications as indicated. Clinic will obtain release of information for current treatment team for continuity of care as needed. Patient will contact this office, call 911 or present to the nearest emergency room should suicidal or homicidal ideations occur. Discussed medication options and treatment plan of prescribed medication(s) as well as the risks, benefits, and potential side effects. Patient acknowledged and verbally consented to continue with current treatment plan and was educated on the importance of compliance with treatment and follow-up appointments.     Follow Up:   Return in about 4 weeks (around 4/4/2025), or morning appt please.      ELISEO Allison, [facility]

## 2025-03-07 NOTE — TELEPHONE ENCOUNTER
Contacted Pharmacy and they confirmed Co-Pay card went through for Pt.   Pt notified that Rx will be $10. .   Pt made aware Rx will be available for pickup the 10th. Pt agreeable and verbalized an understanding to all.

## 2025-03-07 NOTE — TELEPHONE ENCOUNTER
Pt returned a call back and was provided with Co-pay card for [employer]. Pt is going to check with her pharmacy and see if this is more cost effective. Pt will call back if not.

## 2025-03-15 NOTE — TELEPHONE ENCOUNTER
It was nice to meet you and we hope your child begins feeling better soon. Please schedule a follow up appointment with your child's pediatrician as indicated. If your child begins experiencing any new or worsening symptoms, please return to the nearest emergency department right away.      Rx Refill Note  Requested Prescriptions     Pending Prescriptions Disp Refills    diclofenac (VOLTAREN) 50 MG EC tablet 60 tablet 1     Sig: Take 1 tablet by mouth 2 (Two) Times a Day As Needed. for pain      Last office visit with prescribing clinician: 6/12/2023   Last telemedicine visit with prescribing clinician: Visit date not found   Next office visit with prescribing clinician: Visit date not found                         Would you like a call back once the refill request has been completed: [] Yes [] No    If the office needs to give you a call back, can they leave a voicemail: [] Yes [] No    Louise Lagos MA  08/12/23, 09:20 EDT        Patient comment: Per the pharmacy my prescription refills were closed. Which makes zero sense, this happened recently on another maintenance medication and you had to call them. I'm completely out of this one too.

## 2025-04-03 DIAGNOSIS — R60.0 PEDAL EDEMA: ICD-10-CM

## 2025-04-03 DIAGNOSIS — E03.8 SUBCLINICAL HYPOTHYROIDISM: ICD-10-CM

## 2025-04-04 ENCOUNTER — TELEPHONE (OUTPATIENT)
Dept: PSYCHIATRY | Facility: CLINIC | Age: 45
End: 2025-04-04

## 2025-04-04 RX ORDER — LEVOTHYROXINE SODIUM 75 UG/1
75 TABLET ORAL DAILY
Qty: 90 TABLET | Refills: 0 | Status: SHIPPED | OUTPATIENT
Start: 2025-04-04

## 2025-04-04 NOTE — TELEPHONE ENCOUNTER
Patient stated that her insurance company considers this office, a speciality office. She is not able to afford this copay, so she is going to get her meds filled from her PCP. I have emailed her a current med list. If she needs a bridge dose, she is going to let us know.

## 2025-04-04 NOTE — TELEPHONE ENCOUNTER
Rx Refill Note  Requested Prescriptions     Pending Prescriptions Disp Refills    levothyroxine (SYNTHROID, LEVOTHROID) 75 MCG tablet 90 tablet 0     Sig: Take 1 tablet by mouth Daily.      Last office visit with prescribing clinician: 10/14/2024   Last telemedicine visit with prescribing clinician: 10/28/2024   Next office visit with prescribing clinician: Visit date not found                         Would you like a call back once the refill request has been completed: [] Yes [] No    If the office needs to give you a call back, can they leave a voicemail: [] Yes [] No    Candace Solis MA  04/04/25, 13:39 EDT

## 2025-04-15 DIAGNOSIS — F41.9 ACUTE ANXIETY: ICD-10-CM

## 2025-04-15 NOTE — TELEPHONE ENCOUNTER
Caller: Joselyn Shanae Jansenn    Relationship: Self    Best call back number: [phone]     Requested Prescriptions:   Requested Prescriptions     Pending Prescriptions Disp Refills    diazePAM (VALIUM) 2 MG tablet 60 tablet 0     Sig: Take 1 tablet by mouth Every 12 (Twelve) Hours As Needed for Anxiety. for anxiety        Pharmacy where request should be sent: [id number] - [place], [place] - [address] AT [facility] [address] - [phone]  - [phone] FX     Last office visit with prescribing clinician: 10/14/2024   Last telemedicine visit with prescribing clinician: 10/28/2024   Next office visit with prescribing clinician: Visit date not found         Does the patient have less than a 3 day supply:  [x] Yes  [] No    Would you like a call back once the refill request has been completed: [] Yes [x] No    If the office needs to give you a call back, can they leave a voicemail: [] Yes [x] No    Samson Liu Rep   04/15/25 09:10 EDT

## 2025-04-17 ENCOUNTER — TELEMEDICINE (OUTPATIENT)
Dept: FAMILY MEDICINE CLINIC | Facility: CLINIC | Age: 45
End: 2025-04-17
Payer: COMMERCIAL

## 2025-04-17 DIAGNOSIS — Z13.6 SCREENING FOR CARDIOVASCULAR CONDITION: ICD-10-CM

## 2025-04-17 DIAGNOSIS — E03.8 SUBCLINICAL HYPOTHYROIDISM: ICD-10-CM

## 2025-04-17 DIAGNOSIS — Z00.00 PREVENTATIVE HEALTH CARE: ICD-10-CM

## 2025-04-17 DIAGNOSIS — E55.9 VITAMIN D DEFICIENCY: ICD-10-CM

## 2025-04-17 DIAGNOSIS — R73.03 PREDIABETES: ICD-10-CM

## 2025-04-17 DIAGNOSIS — Z13.220 SCREENING FOR HYPERLIPIDEMIA: Primary | ICD-10-CM

## 2025-04-17 DIAGNOSIS — Z13.0 SCREENING FOR DEFICIENCY ANEMIA: ICD-10-CM

## 2025-04-17 DIAGNOSIS — Z13.29 SCREENING FOR ENDOCRINE DISORDER: ICD-10-CM

## 2025-04-17 DIAGNOSIS — Z13.89 SCREENING FOR BLOOD OR PROTEIN IN URINE: ICD-10-CM

## 2025-04-17 RX ORDER — DIAZEPAM 2 MG/1
2 TABLET ORAL EVERY 12 HOURS PRN
Qty: 60 TABLET | Refills: 2 | Status: SHIPPED | OUTPATIENT
Start: 2025-04-17

## 2025-04-17 NOTE — PROGRESS NOTES
Established Patient Virtual Visit      Patient Name: Shanae Alves  : 1980   MRN: [id number]   Care Team: Patient Care Team:  Junior Lujan DO as PCP - General (Family Medicine)    Chief Complaint:    Chief Complaint   Patient presents with    Follow-up     Depression       History of Present Illness: Shanae Alves is a 44 y.o. female who is here today for chief complaint.    HPI    Following up on depression and chronic care    You have chosen to receive care through a telehealth visit.  Do you consent to use a video/audio connection for your medical care today? Yes    Patient location: [address] [place] [address]  [place] [address]   Provider Location: [address] [place] [address]    The following portions of the patient's history were reviewed and updated as appropriate: allergies, current medications, past family history, past medical history, past social history, past surgical history and problem list.    Subjective      Review of Systems:   Review of Systems - See HPI  Review of Systems -  General ROS: negative for - chills, fever or night sweats  Cardiovascular ROS: no chest pain or dyspnea on exertion  Gastrointestinal ROS: no abdominal pain, change in bowel habits, or black or bloody stools  Genito-Urinary ROS: no dysuria, trouble voiding, or hematuria  Past Medical History:   Past Medical History:   Diagnosis Date    Arthritis     Depression     Headache     Hyperlipidemia     Hypothyroidism        Past Surgical History:   Past Surgical History:   Procedure Laterality Date    CERVICAL CERCLAGE         Family History:   Family History   Problem Relation Age of Onset    Arthritis Mother     Hyperlipidemia Mother     Thyroid disease Mother     Arthritis Father        Social History:   Social History     Socioeconomic History    Marital status: Single   Tobacco Use    Smoking status: Every Day     Current packs/day: 1.00     Average packs/day: 1 pack/day for 20.0 years  (20.0 ttl pk-yrs)     Types: Cigarettes     Passive exposure: Never    Smokeless tobacco: Never   Vaping Use    Vaping status: Never Used   Substance and Sexual Activity    Alcohol use: No    Drug use: No    Sexual activity: Defer       Tobacco History:   Social History     Tobacco Use   Smoking Status Every Day    Current packs/day: 1.00    Average packs/day: 1 pack/day for 20.0 years (20.0 ttl pk-yrs)    Types: Cigarettes    Passive exposure: Never   Smokeless Tobacco Never       Medications:   Outpatient Medications Prior to Visit   Medication Sig Dispense Refill    albuterol sulfate  (90 Base) MCG/ACT inhaler INHALE 2 PUFFS BY MOUTH EVERY 6 HOURS AS NEEDED FOR WHEEZING 8.5 g 1    atorvastatin (LIPITOR) 20 MG tablet TAKE 1 TABLET BY MOUTH DAILY 90 tablet 1    Dextromethorphan-buPROPion ER (Auvelity)  MG tablet controlled-release Take one oral tablet every morning x 5 days, then 1 tab morning and afternoon 60 tablet 2    diazePAM (VALIUM) 2 MG tablet Take 1 tablet by mouth Every 12 (Twelve) Hours As Needed for Anxiety. for anxiety 60 tablet 2    diclofenac (VOLTAREN) 50 MG EC tablet Take 1 tablet by mouth 2 (Two) Times a Day As Needed (Pain). for pain 90 tablet 1    DULoxetine (CYMBALTA) 60 MG capsule Take 1 capsule by mouth 2 (Two) Times a Day. 180 capsule 1    levothyroxine (SYNTHROID, LEVOTHROID) 75 MCG tablet Take 1 tablet by mouth Daily. 90 tablet 0    metFORMIN (GLUCOPHAGE) 500 MG tablet Take 1 tablet by mouth 2 (Two) Times a Day With Meals. 180 tablet 1    ondansetron ODT (ZOFRAN-ODT) 8 MG disintegrating tablet Place 1 tablet on the tongue Every 8 (Eight) Hours As Needed for Nausea or Vomiting. 9 tablet 2    promethazine (PHENERGAN) 12.5 MG tablet Take 1 tablet by mouth Every 6 (Six) Hours As Needed for Nausea or Vomiting. 10 tablet 0    propranolol (INDERAL) 10 MG tablet Take 1 tablet by mouth 2 (Two) Times a Day As Needed (take 1-2 tabs up to twice daily as needed for anxiety). 60 tablet 1     rizatriptan (MAXALT) 5 MG tablet Take 1 tablet by mouth Daily As Needed for Migraine. May repeat in 2 hours if needed 9 tablet 2    SUBOXONE 8-2 MG film film dissolve 2 FILMS under the tongue once daily  0    acyclovir (ZOVIRAX) 800 MG tablet As Needed.  0    gabapentin (NEURONTIN) 300 MG capsule Take 2 capsules by mouth Every Morning AND 1 capsule Every Night. 270 capsule 1     No facility-administered medications prior to visit.        Allergies:   No Known Allergies    Objective   Objective     Physical Exam:  Vital Signs:  not currently breastfeeding.  Vitals obtained from patient if available  Physical Exam  Const: Non-toxic appearing, NAD, A&Ox4, Pleasant, Cooperative  Eyes: EOMI, no conjunctivitis  ENT: No copious nasal drainage noted  Cardiac: Regular rate by pulse  Resp: Respiratory rate observed to be within normal limits, no increased work of breathing observed, no audible wheezing or cough noted  Psych: Appropriate mood and behavior.  Assessment & Plan   Assessment / Plan      Assessment/Plan:   Problems Addressed This Visit  Problem List Items Addressed This Visit          Endocrine and Metabolic    Subclinical hypothyroidism    Relevant Orders    TSH Rfx On Abnormal To Free T4    Prediabetes    Overview   A1c 6.11% November 2019         Relevant Orders    Hemoglobin A1c     Other Visit Diagnoses         Screening for hyperlipidemia    -  Primary    Relevant Orders    Lipid Panel      Preventative health care        Relevant Orders    Lipid Panel    High Sensitivity CRP    Hemoglobin A1c    Comprehensive Metabolic Panel    CBC & Differential    Urinalysis With Microscopic If Indicated (No Culture) - Urine, Clean Catch    TSH Rfx On Abnormal To Free T4    Vitamin D,25-Hydroxy      Screening for cardiovascular condition        Relevant Orders    High Sensitivity CRP      Screening for endocrine disorder        Relevant Orders    Hemoglobin A1c    Comprehensive Metabolic Panel    TSH Rfx On Abnormal To  Free T4      Screening for deficiency anemia        Relevant Orders    CBC & Differential      Screening for blood or protein in urine        Relevant Orders    Urinalysis With Microscopic If Indicated (No Culture) - Urine, Clean Catch      Vitamin D deficiency        Relevant Orders    Vitamin D,25-Hydroxy            See patient diagnoses and orders along with patient instructions for assessment, plan, and changes to care for patient.    This visit was conducted via telemedicine with live video and audio provided through [employer] at the point of care.    Patient Instructions   I would like you to have your labs done about a week prior to your next appointment.  You do not need an appointment, but I would advise to call our office a few days before you plan to have your labs done to confirm that orders are in and active.  We will discuss the results at your next scheduled visit.  -Lab services are available at any [facility] outpatient lab center, including across the street at [address] [place] [address]     Your labs should be done when you're in a fasting state.  This means you should not have anything with calories for at least 10 hours prior to the blood draw. Water and black coffee are fine. Both blood sugar and triglyceride levels rise after meals, and this may impact your results. Hormone levels can also change, so unless you are specifically told you do not have to fast for labs, it is better to be fasting.    Additionally, stop any multivitamins or B vitamins (especially biotin) at least 48 hours before your labs since these can interfere with accurate measurement.     No follow-ups on file.    DO KARLA Simpson RD  [facility]  [address]  [facility] [address]  Fax [phone]  Phone [phone]    Disclaimer to patients: The 21st Century Cares Act makes medical notes like these available to patients in the interest of transparency. However, please  be advised that this is still a medical document. It is intended as [id number] communication. Many sections may include medical language or jargon, abbreviations, and additional verbiage that are unfamiliar or confusing. In some ways it may come across as blunt, direct, or may be summarized in order to clearly and concisely communicate the most crucial information to medical professionals. It may also include mentions of conditions that are unlikely but considered as part of the differential diagnosis, including serious disorders. These are not always discussed at length at the time of appointment because their likelihood is so low, but may be included in a medical note to make it clear what has been considered and/or ruled out as part of a work-up. Medical documents are intended to carry relevant information, facts as evident, and the personal clinical opinion of the physician. If you have any questions regarding this medical document, please bring them to the attention of the physician at your next scheduled appointment.

## 2025-04-17 NOTE — PATIENT INSTRUCTIONS
I would like you to have your labs done about a week prior to your next appointment.  You do not need an appointment, but I would advise to call our office a few days before you plan to have your labs done to confirm that orders are in and active.  We will discuss the results at your next scheduled visit.  -Lab services are available at any [facility] outpatient lab center, including across the street at [address] [place] [address] Dr     Your labs should be done when you're in a fasting state.  This means you should not have anything with calories for at least 10 hours prior to the blood draw. Water and black coffee are fine. Both blood sugar and triglyceride levels rise after meals, and this may impact your results. Hormone levels can also change, so unless you are specifically told you do not have to fast for labs, it is better to be fasting.    Additionally, stop any multivitamins or B vitamins (especially biotin) at least 48 hours before your labs since these can interfere with accurate measurement.

## 2025-04-27 DIAGNOSIS — M25.50 POLYARTHRALGIA: ICD-10-CM

## 2025-04-28 RX ORDER — GABAPENTIN 300 MG/1
CAPSULE ORAL
Qty: 270 CAPSULE | Refills: 1 | Status: SHIPPED | OUTPATIENT
Start: 2025-04-28

## 2025-04-28 NOTE — TELEPHONE ENCOUNTER
Rx Refill Note  Requested Prescriptions     Pending Prescriptions Disp Refills    gabapentin (NEURONTIN) 300 MG capsule 270 capsule 1     Sig: Take 2 capsules by mouth Every Morning AND 1 capsule Every Night.      Last office visit with prescribing clinician: 10/14/2024   Last telemedicine visit with prescribing clinician: 4/17/2025   Next office visit with prescribing clinician: 6/13/2025       Verena Oconnell MA  04/28/25, 10:49 EDT

## 2025-04-30 NOTE — TELEPHONE ENCOUNTER
Rx Refill Note  Requested Prescriptions     Pending Prescriptions Disp Refills    acyclovir (ZOVIRAX) 800 MG tablet  0     Sig: As Needed.      Last office visit with prescribing clinician: 10/14/2024   Last telemedicine visit with prescribing clinician: 4/17/2025   Next office visit with prescribing clinician: 6/13/2025                         Would you like a call back once the refill request has been completed: [] Yes [] No    If the office needs to give you a call back, can they leave a voicemail: [] Yes [] No    Candace Solis MA  04/30/25, 12:16 EDT

## 2025-05-01 RX ORDER — ACYCLOVIR 800 MG/1
800 TABLET ORAL DAILY PRN
Qty: 30 TABLET | Refills: 0 | Status: SHIPPED | OUTPATIENT
Start: 2025-05-01

## 2025-05-19 RX ORDER — ALBUTEROL SULFATE 90 UG/1
2 INHALANT RESPIRATORY (INHALATION) EVERY 6 HOURS PRN
Qty: 8.5 G | Refills: 1 | Status: SHIPPED | OUTPATIENT
Start: 2025-05-19

## 2025-06-04 RX ORDER — ALBUTEROL SULFATE 90 UG/1
2 INHALANT RESPIRATORY (INHALATION) EVERY 6 HOURS PRN
Qty: 8.5 G | Refills: 1 | OUTPATIENT
Start: 2025-06-04

## 2025-06-04 NOTE — TELEPHONE ENCOUNTER
Rx Refill Note  Requested Prescriptions     Pending Prescriptions Disp Refills    albuterol sulfate  (90 Base) MCG/ACT inhaler 8.5 g 1     Sig: Inhale 2 puffs Every 6 (Six) Hours As Needed for Wheezing.      Last office visit with prescribing clinician: 10/14/2024   Last telemedicine visit with prescribing clinician: 4/17/2025   Next office visit with prescribing clinician: 8/21/2025                         Would you like a call back once the refill request has been completed: [] Yes [] No    If the office needs to give you a call back, can they leave a voicemail: [] Yes [] No    Verena Oconnell MA  06/04/25, 15:27 EDT

## 2025-06-30 DIAGNOSIS — F33.2 MDD (MAJOR DEPRESSIVE DISORDER), RECURRENT SEVERE, WITHOUT PSYCHOSIS: ICD-10-CM

## 2025-06-30 DIAGNOSIS — F41.0 PANIC DISORDER: ICD-10-CM

## 2025-06-30 DIAGNOSIS — F43.22 ADJUSTMENT DISORDER WITH ANXIOUS MOOD: ICD-10-CM

## 2025-06-30 NOTE — TELEPHONE ENCOUNTER
Caller: Joselyn Shanae Jansenn    Relationship: Self    Best call back number:  [phone] (Mobile)     Requested Prescriptions:   Requested Prescriptions     Pending Prescriptions Disp Refills    Dextromethorphan-buPROPion ER (Auvelity)  MG tablet controlled-release 60 tablet 2     Sig: Take one oral tablet every morning x 5 days, then 1 tab morning and afternoon    propranolol (INDERAL) 10 MG tablet 60 tablet 1     Sig: Take 1 tablet by mouth 2 (Two) Times a Day As Needed (take 1-2 tabs up to twice daily as needed for anxiety).      Pharmacy where request should be sent: [id number] - [place], [place] - [address] AT [facility] [address] - [phone]  - [phone] FX     Last office visit with prescribing clinician: 10/14/2024   Last telemedicine visit with prescribing clinician: 4/17/2025   Next office visit with prescribing clinician: 8/21/2025     Additional details provided by patient:  PATIENT SAID SHE IS NO LONGER SEEING BEHAVIOR HEALTH BECAUSE SHE CAN'T AFFORD IT   SHE IS REQUESTING THE MEDICATIONS AUVELITY AND PROPRANOL FROM DR BARAHONA     Does the patient have less than a 3 day supply:  [x] Yes  [] No    Would you like a call back once the refill request has been completed: [] Yes [x] No    If the office needs to give you a call back, can they leave a voicemail: [] Yes [x] No

## 2025-07-01 RX ORDER — DEXTROMETHORPHAN HYDROBROMIDE, BUPROPION HYDROCHLORIDE 105; 45 MG/1; MG/1
TABLET, MULTILAYER, EXTENDED RELEASE ORAL
Qty: 60 TABLET | Refills: 2 | Status: SHIPPED | OUTPATIENT
Start: 2025-07-01

## 2025-07-01 RX ORDER — PROPRANOLOL HYDROCHLORIDE 10 MG/1
10 TABLET ORAL 2 TIMES DAILY PRN
Qty: 60 TABLET | Refills: 1 | Status: SHIPPED | OUTPATIENT
Start: 2025-07-01

## 2025-07-01 NOTE — TELEPHONE ENCOUNTER
I believe this was forwarded to our office by mistake. See Pt message above. Could you please review and advise? Thanks!

## 2025-07-01 NOTE — TELEPHONE ENCOUNTER
I was under the impression she was no longer coming to our clinic and was having pcp manage her medications.

## 2025-07-03 DIAGNOSIS — F33.2 MDD (MAJOR DEPRESSIVE DISORDER), RECURRENT SEVERE, WITHOUT PSYCHOSIS: ICD-10-CM

## 2025-07-03 RX ORDER — DEXTROMETHORPHAN HYDROBROMIDE, BUPROPION HYDROCHLORIDE 105; 45 MG/1; MG/1
TABLET, MULTILAYER, EXTENDED RELEASE ORAL
Qty: 60 TABLET | Refills: 2 | Status: CANCELLED | OUTPATIENT
Start: 2025-07-03

## 2025-07-08 ENCOUNTER — PRIOR AUTHORIZATION (OUTPATIENT)
Dept: FAMILY MEDICINE CLINIC | Facility: CLINIC | Age: 45
End: 2025-07-08
Payer: COMMERCIAL

## 2025-07-08 NOTE — TELEPHONE ENCOUNTER
(Key: [id number])  Auvelity 45-105MG er tablets  status: Question Response - N/ACreated: July 1st, 2025 [phone]    Information regarding your request  The member benefit does not include pharmacy drug coverage. Eligible drugs may be covered under the medical benefit.

## 2025-07-11 DIAGNOSIS — M25.562 ACUTE PAIN OF LEFT KNEE: ICD-10-CM

## 2025-07-15 DIAGNOSIS — F41.9 ACUTE ANXIETY: ICD-10-CM

## 2025-07-15 DIAGNOSIS — E78.5 HYPERLIPIDEMIA, UNSPECIFIED HYPERLIPIDEMIA TYPE: ICD-10-CM

## 2025-07-15 NOTE — TELEPHONE ENCOUNTER
Rx Refill Note  Requested Prescriptions     Pending Prescriptions Disp Refills    diazePAM (VALIUM) 2 MG tablet 60 tablet 2     Sig: Take 1 tablet by mouth Every 12 (Twelve) Hours As Needed for Anxiety. for anxiety      Last office visit with prescribing clinician: 10/14/2024   Last telemedicine visit with prescribing clinician: 4/17/2025   Next office visit with prescribing clinician: 8/21/2025                         Would you like a call back once the refill request has been completed: [] Yes [] No    If the office needs to give you a call back, can they leave a voicemail: [] Yes [] No    Candace Solis MA  07/15/25, 09:47 EDT

## 2025-07-16 RX ORDER — DIAZEPAM 2 MG/1
2 TABLET ORAL EVERY 12 HOURS PRN
Qty: 60 TABLET | Refills: 2 | Status: SHIPPED | OUTPATIENT
Start: 2025-07-16

## 2025-07-16 RX ORDER — ATORVASTATIN CALCIUM 20 MG/1
20 TABLET, FILM COATED ORAL DAILY
Qty: 90 TABLET | Refills: 1 | Status: SHIPPED | OUTPATIENT
Start: 2025-07-16

## 2025-08-21 ENCOUNTER — LAB (OUTPATIENT)
Dept: LAB | Facility: HOSPITAL | Age: 45
End: 2025-08-21
Payer: COMMERCIAL

## 2025-08-21 PROBLEM — F33.2 MDD (MAJOR DEPRESSIVE DISORDER), RECURRENT SEVERE, WITHOUT PSYCHOSIS: Status: ACTIVE | Noted: 2025-08-21
